# Patient Record
Sex: FEMALE | Race: WHITE | NOT HISPANIC OR LATINO | Employment: FULL TIME | URBAN - METROPOLITAN AREA
[De-identification: names, ages, dates, MRNs, and addresses within clinical notes are randomized per-mention and may not be internally consistent; named-entity substitution may affect disease eponyms.]

---

## 2023-03-14 LAB
ALANINE AMINOTRANSFERASE (SGPT) (U/L) IN SER/PLAS: 17 U/L (ref 7–45)
ALBUMIN (G/DL) IN SER/PLAS: 4.2 G/DL (ref 3.4–5)
ALKALINE PHOSPHATASE (U/L) IN SER/PLAS: 64 U/L (ref 33–136)
ANION GAP IN SER/PLAS: 12 MMOL/L (ref 10–20)
ASPARTATE AMINOTRANSFERASE (SGOT) (U/L) IN SER/PLAS: 17 U/L (ref 9–39)
BASOPHILS (10*3/UL) IN BLOOD BY AUTOMATED COUNT: 0.07 X10E9/L (ref 0–0.1)
BASOPHILS/100 LEUKOCYTES IN BLOOD BY AUTOMATED COUNT: 1.2 % (ref 0–2)
BILIRUBIN TOTAL (MG/DL) IN SER/PLAS: 0.4 MG/DL (ref 0–1.2)
C REACTIVE PROTEIN (MG/L) IN SER/PLAS: 0.7 MG/DL
CALCIUM (MG/DL) IN SER/PLAS: 9.8 MG/DL (ref 8.6–10.3)
CARBON DIOXIDE, TOTAL (MMOL/L) IN SER/PLAS: 25 MMOL/L (ref 21–32)
CHLORIDE (MMOL/L) IN SER/PLAS: 106 MMOL/L (ref 98–107)
CREATININE (MG/DL) IN SER/PLAS: 0.72 MG/DL (ref 0.5–1.05)
CREATININE (MG/DL) IN URINE: 58.8 MG/DL (ref 20–320)
EOSINOPHILS (10*3/UL) IN BLOOD BY AUTOMATED COUNT: 0.15 X10E9/L (ref 0–0.7)
EOSINOPHILS/100 LEUKOCYTES IN BLOOD BY AUTOMATED COUNT: 2.6 % (ref 0–6)
ERYTHROCYTE DISTRIBUTION WIDTH (RATIO) BY AUTOMATED COUNT: 14.5 % (ref 11.5–14.5)
ERYTHROCYTE MEAN CORPUSCULAR HEMOGLOBIN CONCENTRATION (G/DL) BY AUTOMATED: 31.4 G/DL (ref 32–36)
ERYTHROCYTE MEAN CORPUSCULAR VOLUME (FL) BY AUTOMATED COUNT: 95 FL (ref 80–100)
ERYTHROCYTES (10*6/UL) IN BLOOD BY AUTOMATED COUNT: 4.15 X10E12/L (ref 4–5.2)
GFR FEMALE: >90 ML/MIN/1.73M2
GLUCOSE (MG/DL) IN SER/PLAS: 80 MG/DL (ref 74–99)
HEMATOCRIT (%) IN BLOOD BY AUTOMATED COUNT: 39.5 % (ref 36–46)
HEMOGLOBIN (G/DL) IN BLOOD: 12.4 G/DL (ref 12–16)
IMMATURE GRANULOCYTES/100 LEUKOCYTES IN BLOOD BY AUTOMATED COUNT: 0.2 % (ref 0–0.9)
LEUKOCYTES (10*3/UL) IN BLOOD BY AUTOMATED COUNT: 5.7 X10E9/L (ref 4.4–11.3)
LYMPHOCYTES (10*3/UL) IN BLOOD BY AUTOMATED COUNT: 1.44 X10E9/L (ref 1.2–4.8)
LYMPHOCYTES/100 LEUKOCYTES IN BLOOD BY AUTOMATED COUNT: 25.3 % (ref 13–44)
MONOCYTES (10*3/UL) IN BLOOD BY AUTOMATED COUNT: 0.47 X10E9/L (ref 0.1–1)
MONOCYTES/100 LEUKOCYTES IN BLOOD BY AUTOMATED COUNT: 8.3 % (ref 2–10)
NEUTROPHILS (10*3/UL) IN BLOOD BY AUTOMATED COUNT: 3.55 X10E9/L (ref 1.2–7.7)
NEUTROPHILS/100 LEUKOCYTES IN BLOOD BY AUTOMATED COUNT: 62.4 % (ref 40–80)
PLATELETS (10*3/UL) IN BLOOD AUTOMATED COUNT: 293 X10E9/L (ref 150–450)
POTASSIUM (MMOL/L) IN SER/PLAS: 4.2 MMOL/L (ref 3.5–5.3)
PROTEIN (MG/DL) IN URINE: 6 MG/DL (ref 5–24)
PROTEIN TOTAL: 7.4 G/DL (ref 6.4–8.2)
PROTEIN/CREATININE (MG/MG) IN URINE: 0.1 MG/MG CREAT (ref 0–0.17)
SEDIMENTATION RATE, ERYTHROCYTE: 29 MM/H (ref 0–30)
SODIUM (MMOL/L) IN SER/PLAS: 139 MMOL/L (ref 136–145)
UREA NITROGEN (MG/DL) IN SER/PLAS: 17 MG/DL (ref 6–23)

## 2023-03-15 LAB
ANTI-DNA (DS): 2 IU/ML
COMPLEMENT C3 (MG/DL) IN SER/PLAS: 150 MG/DL (ref 87–200)
COMPLEMENT C4 (MG/DL) IN SER/PLAS: 39 MG/DL (ref 10–50)

## 2023-03-28 DIAGNOSIS — I10 ESSENTIAL HYPERTENSION: ICD-10-CM

## 2023-03-28 RX ORDER — AMLODIPINE BESYLATE 5 MG/1
5 TABLET ORAL DAILY
Qty: 90 TABLET | Refills: 1 | Status: SHIPPED | OUTPATIENT
Start: 2023-03-28 | End: 2023-09-14 | Stop reason: SDUPTHER

## 2023-03-28 RX ORDER — AMLODIPINE BESYLATE 5 MG/1
1 TABLET ORAL DAILY
COMMUNITY
Start: 2018-12-17 | End: 2023-03-28 | Stop reason: SDUPTHER

## 2023-05-05 PROBLEM — N89.8 VAGINAL DRYNESS: Status: ACTIVE | Noted: 2023-05-05

## 2023-05-05 PROBLEM — M32.9 SYSTEMIC LUPUS ERYTHEMATOSUS (MULTI): Status: ACTIVE | Noted: 2023-05-05

## 2023-05-05 PROBLEM — K21.9 GERD (GASTROESOPHAGEAL REFLUX DISEASE): Status: ACTIVE | Noted: 2023-05-05

## 2023-05-05 PROBLEM — F32.A DEPRESSIVE DISORDER: Status: ACTIVE | Noted: 2023-05-05

## 2023-05-05 PROBLEM — R80.9 PROTEINURIA: Status: ACTIVE | Noted: 2023-05-05

## 2023-05-05 PROBLEM — G47.00 INSOMNIA: Status: ACTIVE | Noted: 2023-05-05

## 2023-05-05 PROBLEM — M35.00 SJOGRENS SYNDROME (MULTI): Status: ACTIVE | Noted: 2023-05-05

## 2023-05-05 PROBLEM — R94.31 ABNORMAL EKG: Status: ACTIVE | Noted: 2023-05-05

## 2023-05-05 PROBLEM — E66.811 OBESITY (BMI 30.0-34.9): Status: ACTIVE | Noted: 2023-05-05

## 2023-05-05 PROBLEM — N60.19 FIBROCYSTIC BREAST DISEASE: Status: ACTIVE | Noted: 2023-05-05

## 2023-05-05 PROBLEM — E66.9 OBESITY (BMI 30.0-34.9): Status: ACTIVE | Noted: 2023-05-05

## 2023-05-05 PROBLEM — M79.18 MYOFASCIAL PAIN SYNDROME: Status: ACTIVE | Noted: 2023-05-05

## 2023-05-05 PROBLEM — J30.9 ALLERGIC RHINITIS: Status: ACTIVE | Noted: 2023-05-05

## 2023-05-05 PROBLEM — M85.80 OSTEOPENIA: Status: ACTIVE | Noted: 2023-05-05

## 2023-05-05 PROBLEM — N95.1 POSTMENOPAUSAL DISORDER: Status: ACTIVE | Noted: 2023-05-05

## 2023-05-05 RX ORDER — ZOLPIDEM TARTRATE 10 MG/1
1 TABLET ORAL DAILY
COMMUNITY
Start: 2021-09-22 | End: 2023-07-31 | Stop reason: SDUPTHER

## 2023-05-05 RX ORDER — ESOMEPRAZOLE MAGNESIUM 40 MG/1
40 CAPSULE, DELAYED RELEASE ORAL
COMMUNITY

## 2023-05-05 RX ORDER — GABAPENTIN 300 MG/1
300 CAPSULE ORAL 2 TIMES DAILY
COMMUNITY
Start: 2023-03-14

## 2023-05-05 RX ORDER — PNV NO.95/FERROUS FUM/FOLIC AC 28MG-0.8MG
TABLET ORAL
COMMUNITY
Start: 2019-04-15

## 2023-05-05 RX ORDER — LOSARTAN POTASSIUM 100 MG/1
1 TABLET ORAL DAILY
COMMUNITY
Start: 2016-09-14 | End: 2023-08-21 | Stop reason: SDUPTHER

## 2023-05-05 RX ORDER — RUXOLITINIB 15 MG/G
CREAM TOPICAL
COMMUNITY
Start: 2023-01-12

## 2023-05-05 RX ORDER — PYRIDOXINE HCL (VITAMIN B6) 100 MG
TABLET ORAL
COMMUNITY
Start: 2022-03-16

## 2023-05-05 RX ORDER — CEVIMELINE HYDROCHLORIDE 30 MG/1
1 CAPSULE ORAL 2 TIMES DAILY
COMMUNITY
Start: 2018-06-20 | End: 2024-02-19 | Stop reason: SDUPTHER

## 2023-05-05 RX ORDER — BUPROPION HYDROCHLORIDE 300 MG/1
1 TABLET ORAL DAILY
COMMUNITY
Start: 2022-03-16 | End: 2023-05-08 | Stop reason: ALTCHOICE

## 2023-05-05 RX ORDER — MIRTAZAPINE 30 MG/1
1 TABLET, FILM COATED ORAL NIGHTLY
COMMUNITY
Start: 2022-08-26 | End: 2023-05-08 | Stop reason: ALTCHOICE

## 2023-05-05 RX ORDER — HYDROXYCHLOROQUINE SULFATE 200 MG/1
TABLET, FILM COATED ORAL
COMMUNITY
Start: 2012-11-28 | End: 2024-02-19 | Stop reason: SDUPTHER

## 2023-05-05 RX ORDER — PAROXETINE 10 MG/1
1 TABLET, FILM COATED ORAL DAILY
COMMUNITY
Start: 2022-05-27 | End: 2023-05-08 | Stop reason: ALTCHOICE

## 2023-05-05 RX ORDER — KETOCONAZOLE 20 MG/ML
SHAMPOO, SUSPENSION TOPICAL
COMMUNITY
Start: 2022-01-12

## 2023-05-05 RX ORDER — CYCLOSPORINE 0.5 MG/ML
1 EMULSION OPHTHALMIC 2 TIMES DAILY
COMMUNITY

## 2023-05-05 RX ORDER — ACETAMINOPHEN AND PHENYLEPHRINE HCL 325; 5 MG/1; MG/1
TABLET ORAL
COMMUNITY
Start: 2022-03-16

## 2023-05-05 RX ORDER — FLUTICASONE PROPIONATE 50 MCG
SPRAY, SUSPENSION (ML) NASAL
COMMUNITY
Start: 2015-07-20

## 2023-05-05 RX ORDER — BUPROPION HYDROCHLORIDE 150 MG/1
1 TABLET ORAL DAILY
COMMUNITY
Start: 2022-06-14 | End: 2023-05-08 | Stop reason: ALTCHOICE

## 2023-05-08 ENCOUNTER — OFFICE VISIT (OUTPATIENT)
Dept: PRIMARY CARE | Facility: CLINIC | Age: 63
End: 2023-05-08
Payer: COMMERCIAL

## 2023-05-08 VITALS
BODY MASS INDEX: 32.28 KG/M2 | SYSTOLIC BLOOD PRESSURE: 138 MMHG | RESPIRATION RATE: 16 BRPM | DIASTOLIC BLOOD PRESSURE: 90 MMHG | WEIGHT: 182.2 LBS | OXYGEN SATURATION: 99 % | HEART RATE: 71 BPM | TEMPERATURE: 97.5 F

## 2023-05-08 DIAGNOSIS — I10 ESSENTIAL HYPERTENSION: ICD-10-CM

## 2023-05-08 DIAGNOSIS — Z00.00 ANNUAL PHYSICAL EXAM: ICD-10-CM

## 2023-05-08 DIAGNOSIS — M35.00 SJOGREN'S SYNDROME, WITH UNSPECIFIED ORGAN INVOLVEMENT (MULTI): ICD-10-CM

## 2023-05-08 DIAGNOSIS — K21.9 GASTROESOPHAGEAL REFLUX DISEASE WITHOUT ESOPHAGITIS: ICD-10-CM

## 2023-05-08 DIAGNOSIS — F51.01 PRIMARY INSOMNIA: Primary | ICD-10-CM

## 2023-05-08 DIAGNOSIS — Z12.31 ENCOUNTER FOR SCREENING MAMMOGRAM FOR MALIGNANT NEOPLASM OF BREAST: ICD-10-CM

## 2023-05-08 DIAGNOSIS — M79.18 MYOFASCIAL PAIN SYNDROME: ICD-10-CM

## 2023-05-08 DIAGNOSIS — M32.8 OTHER FORMS OF SYSTEMIC LUPUS ERYTHEMATOSUS, UNSPECIFIED ORGAN INVOLVEMENT STATUS (MULTI): ICD-10-CM

## 2023-05-08 PROBLEM — F32.A DEPRESSIVE DISORDER: Status: RESOLVED | Noted: 2023-05-05 | Resolved: 2023-05-08

## 2023-05-08 PROCEDURE — 1036F TOBACCO NON-USER: CPT | Performed by: INTERNAL MEDICINE

## 2023-05-08 PROCEDURE — 3080F DIAST BP >= 90 MM HG: CPT | Performed by: INTERNAL MEDICINE

## 2023-05-08 PROCEDURE — 99214 OFFICE O/P EST MOD 30 MIN: CPT | Performed by: INTERNAL MEDICINE

## 2023-05-08 PROCEDURE — 3075F SYST BP GE 130 - 139MM HG: CPT | Performed by: INTERNAL MEDICINE

## 2023-05-08 RX ORDER — UBIDECARENONE 30 MG
30 CAPSULE ORAL DAILY
COMMUNITY

## 2023-05-08 RX ORDER — CARBOXYMETHYLCELLULOSE SODIUM, GLYCERIN AND POLYSORBATE 80 5; 10; 5 MG/ML; MG/ML; MG/ML
SOLUTION/ DROPS OPHTHALMIC
COMMUNITY

## 2023-05-08 NOTE — PROGRESS NOTES
The patient is here today for a follow up appointment.Subjective   Patient ID: Mojgan Villalobos is a 62 y.o. female who presents for Follow-up.  63 yo female here for followup  Gabapentin is helping her achiness - it is keeping her asleep  She still need the zolpidem        Review of Systems    Objective   Physical Exam  Vitals and nursing note reviewed.   Cardiovascular:      Rate and Rhythm: Normal rate and regular rhythm.   Pulmonary:      Effort: Pulmonary effort is normal.      Breath sounds: Normal breath sounds.         Assessment/Plan   Problem List Items Addressed This Visit       Essential hypertension    Current Assessment & Plan     A little high today - monitor         GERD (gastroesophageal reflux disease)    Insomnia - Primary    Current Assessment & Plan     Rec add in Melatonin and see if she can decrease Zolpidem dose         Myofascial pain syndrome    Current Assessment & Plan     Rec take Aleve 2 po bid on days she is active         Sjogrens syndrome (CMS/HCC)    Systemic lupus erythematosus (CMS/HCC)    Follow up with me in October for Welcome to Medicare

## 2023-07-18 LAB
ANION GAP IN SER/PLAS: 12 MMOL/L (ref 10–20)
BASOPHILS (10*3/UL) IN BLOOD BY AUTOMATED COUNT: 0.06 X10E9/L (ref 0–0.1)
BASOPHILS/100 LEUKOCYTES IN BLOOD BY AUTOMATED COUNT: 1.1 % (ref 0–2)
CALCIUM (MG/DL) IN SER/PLAS: 9.8 MG/DL (ref 8.6–10.3)
CARBON DIOXIDE, TOTAL (MMOL/L) IN SER/PLAS: 28 MMOL/L (ref 21–32)
CHLORIDE (MMOL/L) IN SER/PLAS: 105 MMOL/L (ref 98–107)
CREATININE (MG/DL) IN SER/PLAS: 0.84 MG/DL (ref 0.5–1.05)
CREATININE (MG/DL) IN URINE: 69.9 MG/DL (ref 20–320)
EOSINOPHILS (10*3/UL) IN BLOOD BY AUTOMATED COUNT: 0.27 X10E9/L (ref 0–0.7)
EOSINOPHILS/100 LEUKOCYTES IN BLOOD BY AUTOMATED COUNT: 4.8 % (ref 0–6)
ERYTHROCYTE DISTRIBUTION WIDTH (RATIO) BY AUTOMATED COUNT: 14.5 % (ref 11.5–14.5)
ERYTHROCYTE MEAN CORPUSCULAR HEMOGLOBIN CONCENTRATION (G/DL) BY AUTOMATED: 33.3 G/DL (ref 32–36)
ERYTHROCYTE MEAN CORPUSCULAR VOLUME (FL) BY AUTOMATED COUNT: 90 FL (ref 80–100)
ERYTHROCYTES (10*6/UL) IN BLOOD BY AUTOMATED COUNT: 4.05 X10E12/L (ref 4–5.2)
GFR FEMALE: 77 ML/MIN/1.73M2
GLUCOSE (MG/DL) IN SER/PLAS: 101 MG/DL (ref 74–99)
HEMATOCRIT (%) IN BLOOD BY AUTOMATED COUNT: 36.6 % (ref 36–46)
HEMOGLOBIN (G/DL) IN BLOOD: 12.2 G/DL (ref 12–16)
IMMATURE GRANULOCYTES/100 LEUKOCYTES IN BLOOD BY AUTOMATED COUNT: 0.2 % (ref 0–0.9)
LEUKOCYTES (10*3/UL) IN BLOOD BY AUTOMATED COUNT: 5.7 X10E9/L (ref 4.4–11.3)
LYMPHOCYTES (10*3/UL) IN BLOOD BY AUTOMATED COUNT: 1.35 X10E9/L (ref 1.2–4.8)
LYMPHOCYTES/100 LEUKOCYTES IN BLOOD BY AUTOMATED COUNT: 23.9 % (ref 13–44)
MONOCYTES (10*3/UL) IN BLOOD BY AUTOMATED COUNT: 0.61 X10E9/L (ref 0.1–1)
MONOCYTES/100 LEUKOCYTES IN BLOOD BY AUTOMATED COUNT: 10.8 % (ref 2–10)
NEUTROPHILS (10*3/UL) IN BLOOD BY AUTOMATED COUNT: 3.35 X10E9/L (ref 1.2–7.7)
NEUTROPHILS/100 LEUKOCYTES IN BLOOD BY AUTOMATED COUNT: 59.2 % (ref 40–80)
PLATELETS (10*3/UL) IN BLOOD AUTOMATED COUNT: 326 X10E9/L (ref 150–450)
POTASSIUM (MMOL/L) IN SER/PLAS: 4.9 MMOL/L (ref 3.5–5.3)
PROTEIN (MG/DL) IN URINE: 7 MG/DL (ref 5–24)
PROTEIN/CREATININE (MG/MG) IN URINE: 0.1 MG/MG CREAT (ref 0–0.17)
SEDIMENTATION RATE, ERYTHROCYTE: 9 MM/H (ref 0–30)
SODIUM (MMOL/L) IN SER/PLAS: 140 MMOL/L (ref 136–145)
UREA NITROGEN (MG/DL) IN SER/PLAS: 19 MG/DL (ref 6–23)

## 2023-07-31 DIAGNOSIS — F51.01 PRIMARY INSOMNIA: Primary | ICD-10-CM

## 2023-07-31 RX ORDER — ZOLPIDEM TARTRATE 10 MG/1
10 TABLET ORAL DAILY
Qty: 90 TABLET | Refills: 0 | Status: SHIPPED | OUTPATIENT
Start: 2023-07-31 | End: 2023-10-12 | Stop reason: SDUPTHER

## 2023-08-21 DIAGNOSIS — I10 ESSENTIAL HYPERTENSION: ICD-10-CM

## 2023-08-21 RX ORDER — LOSARTAN POTASSIUM 100 MG/1
100 TABLET ORAL DAILY
Qty: 90 TABLET | Refills: 1 | Status: SHIPPED | OUTPATIENT
Start: 2023-08-21 | End: 2024-02-19 | Stop reason: SDUPTHER

## 2023-09-14 DIAGNOSIS — I10 ESSENTIAL HYPERTENSION: ICD-10-CM

## 2023-09-14 RX ORDER — AMLODIPINE BESYLATE 5 MG/1
5 TABLET ORAL DAILY
Qty: 90 TABLET | Refills: 1 | Status: SHIPPED | OUTPATIENT
Start: 2023-09-14 | End: 2023-09-25

## 2023-09-23 DIAGNOSIS — I10 ESSENTIAL HYPERTENSION: ICD-10-CM

## 2023-09-23 PROBLEM — E66.811 CLASS 1 OBESITY WITH BODY MASS INDEX (BMI) OF 31.0 TO 31.9 IN ADULT: Status: ACTIVE | Noted: 2023-09-23

## 2023-09-23 PROBLEM — G57.00 PIRIFORMIS SYNDROME: Status: ACTIVE | Noted: 2023-09-23

## 2023-09-23 PROBLEM — M79.7 FIBROMYALGIA: Status: ACTIVE | Noted: 2023-09-23

## 2023-09-23 PROBLEM — E66.9 CLASS 1 OBESITY WITH BODY MASS INDEX (BMI) OF 31.0 TO 31.9 IN ADULT: Status: ACTIVE | Noted: 2023-09-23

## 2023-09-23 PROBLEM — M76.30 ITB SYNDROME: Status: ACTIVE | Noted: 2023-09-23

## 2023-09-23 RX ORDER — PAROXETINE 10 MG/1
10 TABLET, FILM COATED ORAL
COMMUNITY

## 2023-09-25 RX ORDER — AMLODIPINE BESYLATE 5 MG/1
5 TABLET ORAL DAILY
Qty: 90 TABLET | Refills: 1 | Status: SHIPPED | OUTPATIENT
Start: 2023-09-25 | End: 2024-02-19 | Stop reason: SDUPTHER

## 2023-09-27 ENCOUNTER — LAB (OUTPATIENT)
Dept: LAB | Facility: LAB | Age: 63
End: 2023-09-27
Payer: MEDICARE

## 2023-09-27 DIAGNOSIS — I10 ESSENTIAL HYPERTENSION: ICD-10-CM

## 2023-09-27 DIAGNOSIS — Z00.00 ANNUAL PHYSICAL EXAM: ICD-10-CM

## 2023-09-27 LAB
CHOLESTEROL (MG/DL) IN SER/PLAS: 179 MG/DL (ref 0–199)
CHOLESTEROL IN HDL (MG/DL) IN SER/PLAS: 63.6 MG/DL
CHOLESTEROL/HDL RATIO: 2.8
HEPATITIS C VIRUS AB PRESENCE IN SERUM: NONREACTIVE
LDL: 102 MG/DL (ref 0–99)
THYROTROPIN (MIU/L) IN SER/PLAS BY DETECTION LIMIT <= 0.05 MIU/L: 2.58 MIU/L (ref 0.44–3.98)
TRIGLYCERIDE (MG/DL) IN SER/PLAS: 67 MG/DL (ref 0–149)
VLDL: 13 MG/DL (ref 0–40)

## 2023-09-27 PROCEDURE — 84443 ASSAY THYROID STIM HORMONE: CPT

## 2023-09-27 PROCEDURE — 36415 COLL VENOUS BLD VENIPUNCTURE: CPT

## 2023-09-27 PROCEDURE — 86803 HEPATITIS C AB TEST: CPT

## 2023-09-27 PROCEDURE — 80061 LIPID PANEL: CPT

## 2023-10-10 ENCOUNTER — APPOINTMENT (OUTPATIENT)
Dept: RADIOLOGY | Facility: CLINIC | Age: 63
End: 2023-10-10
Payer: COMMERCIAL

## 2023-10-11 ENCOUNTER — ANCILLARY PROCEDURE (OUTPATIENT)
Dept: RADIOLOGY | Facility: CLINIC | Age: 63
End: 2023-10-11
Payer: MEDICARE

## 2023-10-11 DIAGNOSIS — Z12.31 ENCOUNTER FOR SCREENING MAMMOGRAM FOR MALIGNANT NEOPLASM OF BREAST: ICD-10-CM

## 2023-10-11 PROCEDURE — 77067 SCR MAMMO BI INCL CAD: CPT | Mod: BILATERAL PROCEDURE | Performed by: RADIOLOGY

## 2023-10-11 PROCEDURE — 77063 BREAST TOMOSYNTHESIS BI: CPT

## 2023-10-11 PROCEDURE — 77063 BREAST TOMOSYNTHESIS BI: CPT | Mod: BILATERAL PROCEDURE | Performed by: RADIOLOGY

## 2023-10-12 ENCOUNTER — LAB (OUTPATIENT)
Dept: LAB | Facility: LAB | Age: 63
End: 2023-10-12
Payer: MEDICARE

## 2023-10-12 ENCOUNTER — OFFICE VISIT (OUTPATIENT)
Dept: PRIMARY CARE | Facility: CLINIC | Age: 63
End: 2023-10-12
Payer: MEDICARE

## 2023-10-12 VITALS
HEIGHT: 63 IN | RESPIRATION RATE: 16 BRPM | DIASTOLIC BLOOD PRESSURE: 86 MMHG | OXYGEN SATURATION: 97 % | SYSTOLIC BLOOD PRESSURE: 152 MMHG | TEMPERATURE: 97 F | BODY MASS INDEX: 32.04 KG/M2 | HEART RATE: 71 BPM | WEIGHT: 180.8 LBS

## 2023-10-12 DIAGNOSIS — Z13.6 SCREENING FOR CARDIOVASCULAR CONDITION: ICD-10-CM

## 2023-10-12 DIAGNOSIS — Z23 ENCOUNTER FOR IMMUNIZATION: ICD-10-CM

## 2023-10-12 DIAGNOSIS — M32.8 OTHER FORMS OF SYSTEMIC LUPUS ERYTHEMATOSUS, UNSPECIFIED ORGAN INVOLVEMENT STATUS (MULTI): ICD-10-CM

## 2023-10-12 DIAGNOSIS — I10 ESSENTIAL HYPERTENSION: ICD-10-CM

## 2023-10-12 DIAGNOSIS — M79.10 MYALGIA: ICD-10-CM

## 2023-10-12 DIAGNOSIS — F51.01 PRIMARY INSOMNIA: ICD-10-CM

## 2023-10-12 DIAGNOSIS — K21.9 GASTROESOPHAGEAL REFLUX DISEASE WITHOUT ESOPHAGITIS: ICD-10-CM

## 2023-10-12 DIAGNOSIS — M79.10 MYALGIA: Chronic | ICD-10-CM

## 2023-10-12 DIAGNOSIS — Z00.00 ROUTINE GENERAL MEDICAL EXAMINATION AT HEALTH CARE FACILITY: Primary | ICD-10-CM

## 2023-10-12 LAB
CRP SERPL-MCNC: 1.54 MG/DL
ERYTHROCYTE [SEDIMENTATION RATE] IN BLOOD BY WESTERGREN METHOD: 12 MM/H (ref 0–30)

## 2023-10-12 PROCEDURE — 3077F SYST BP >= 140 MM HG: CPT | Performed by: INTERNAL MEDICINE

## 2023-10-12 PROCEDURE — G0009 ADMIN PNEUMOCOCCAL VACCINE: HCPCS | Performed by: INTERNAL MEDICINE

## 2023-10-12 PROCEDURE — G0008 ADMIN INFLUENZA VIRUS VAC: HCPCS | Performed by: INTERNAL MEDICINE

## 2023-10-12 PROCEDURE — 90677 PCV20 VACCINE IM: CPT | Performed by: INTERNAL MEDICINE

## 2023-10-12 PROCEDURE — 90662 IIV NO PRSV INCREASED AG IM: CPT | Performed by: INTERNAL MEDICINE

## 2023-10-12 PROCEDURE — G0403 EKG FOR INITIAL PREVENT EXAM: HCPCS | Performed by: INTERNAL MEDICINE

## 2023-10-12 PROCEDURE — G0402 INITIAL PREVENTIVE EXAM: HCPCS | Performed by: INTERNAL MEDICINE

## 2023-10-12 PROCEDURE — 1036F TOBACCO NON-USER: CPT | Performed by: INTERNAL MEDICINE

## 2023-10-12 PROCEDURE — 1160F RVW MEDS BY RX/DR IN RCRD: CPT | Performed by: INTERNAL MEDICINE

## 2023-10-12 PROCEDURE — 36415 COLL VENOUS BLD VENIPUNCTURE: CPT

## 2023-10-12 PROCEDURE — 85652 RBC SED RATE AUTOMATED: CPT

## 2023-10-12 PROCEDURE — 86140 C-REACTIVE PROTEIN: CPT

## 2023-10-12 PROCEDURE — 1159F MED LIST DOCD IN RCRD: CPT | Performed by: INTERNAL MEDICINE

## 2023-10-12 PROCEDURE — 3079F DIAST BP 80-89 MM HG: CPT | Performed by: INTERNAL MEDICINE

## 2023-10-12 PROCEDURE — 1170F FXNL STATUS ASSESSED: CPT | Performed by: INTERNAL MEDICINE

## 2023-10-12 RX ORDER — ZOLPIDEM TARTRATE 10 MG/1
10 TABLET ORAL DAILY
Qty: 90 TABLET | Refills: 1 | Status: SHIPPED | OUTPATIENT
Start: 2023-10-12 | End: 2024-01-23 | Stop reason: SDUPTHER

## 2023-10-12 ASSESSMENT — VISUAL ACUITY
OD_CC: 20/30
OS_CC: 20/25

## 2023-10-12 ASSESSMENT — ACTIVITIES OF DAILY LIVING (ADL)
MANAGING_FINANCES: INDEPENDENT
BATHING: INDEPENDENT
DOING_HOUSEWORK: INDEPENDENT
GROCERY_SHOPPING: INDEPENDENT
DRESSING: INDEPENDENT
TAKING_MEDICATION: INDEPENDENT

## 2023-10-12 ASSESSMENT — PATIENT HEALTH QUESTIONNAIRE - PHQ9
2. FEELING DOWN, DEPRESSED OR HOPELESS: NOT AT ALL
SUM OF ALL RESPONSES TO PHQ9 QUESTIONS 1 AND 2: 0
1. LITTLE INTEREST OR PLEASURE IN DOING THINGS: NOT AT ALL

## 2023-10-12 NOTE — PROGRESS NOTES
"Subjective   Reason for Visit: Mojgan Villalobos is an 63 y.o. female here for a Medicare Wellness visit.     Past Medical, Surgical, and Family History reviewed and updated in chart.         62 yo female her for Welcome to Medicare Visit    C/o 2 months of worsening upper extremity pain- bilateral   Hurts to lift arms up  Cannot combing her hair wihtout pain  Trouble getting up from a chair  No paresthesias  Does all her housework and yardwork and grooms dogs - this is all exhaustin  Pain wakes her up at night  Both ankles are swollen too        Patient Care Team:  Isabella Barrios MD as PCP - General  Isabella Barrios MD as PCP - [facility] PCP  Jerman Renner MD as Referring Physician ([facility])  Sheri Waldron MD as Referring Physician (Ophthalmology)  Nolan Pugh DC as Referring Physician (Chiropractic Medicine)     Review of Systems   Constitutional:  Negative for activity change, appetite change, fatigue and unexpected weight change.   HENT:  Negative for ear pain, hearing loss, tinnitus, trouble swallowing and voice change.    Eyes:  Negative for visual disturbance.   Respiratory:  Negative for cough, chest tightness and wheezing.    Cardiovascular:  Negative for chest pain, palpitations and leg swelling.   Gastrointestinal:  Negative for blood in stool, constipation, diarrhea, nausea and vomiting.   Genitourinary:  Negative for difficulty urinating, hematuria and vaginal bleeding.   Musculoskeletal:  Positive for arthralgias, myalgias and neck pain.   Skin:  Negative for rash.   Neurological:  Negative for dizziness, tremors, syncope, light-headedness and headaches.       Objective   Vitals:  /86   Pulse 71   Temp 36.1 °C (97 °F)   Resp 16   Ht 1.6 m (5' 3\")   Wt 82 kg (180 lb 12.8 oz)   SpO2 97%   BMI 32.03 kg/m²       Physical Exam  Vitals and nursing note reviewed.   Constitutional:       General: She is not in acute distress.     Appearance: She is " well-developed. She is not diaphoretic.   HENT:      Head: Normocephalic.      Right Ear: Tympanic membrane normal. There is no impacted cerumen.      Left Ear: Tympanic membrane normal. There is no impacted cerumen.      Nose: Nose normal.      Mouth/Throat:      Mouth: Mucous membranes are moist.      Pharynx: Oropharynx is clear. No oropharyngeal exudate or posterior oropharyngeal erythema.   Eyes:      General: No scleral icterus.     Extraocular Movements: Extraocular movements intact.      Conjunctiva/sclera: Conjunctivae normal.      Pupils: Pupils are equal, round, and reactive to light.   Neck:      Thyroid: No thyromegaly.      Vascular: No JVD.   Cardiovascular:      Rate and Rhythm: Normal rate and regular rhythm.      Pulses: Normal pulses.      Heart sounds: Normal heart sounds. No murmur heard.     No friction rub. No gallop.   Pulmonary:      Effort: Pulmonary effort is normal. No respiratory distress.      Breath sounds: Normal breath sounds. No wheezing or rales.   Chest:      Chest wall: No tenderness.   Abdominal:      General: Bowel sounds are normal. There is no distension.      Palpations: Abdomen is soft. There is no mass.      Tenderness: There is no abdominal tenderness. There is no rebound.   Musculoskeletal:         General: Normal range of motion.      Right shoulder: Normal.      Left shoulder: Normal.      Right upper arm: Normal.      Left upper arm: Normal.      Cervical back: Normal range of motion and neck supple.      Comments: Pain with ROM bilateral UE   Lymphadenopathy:      Cervical: No cervical adenopathy.   Skin:     General: Skin is warm and dry.   Neurological:      General: No focal deficit present.      Mental Status: She is alert and oriented to person, place, and time.      Deep Tendon Reflexes: Reflexes normal.   Psychiatric:         Mood and Affect: Mood normal.         Thought Content: Thought content normal.         Assessment/Plan   Problem List Items Addressed  This Visit       Essential hypertension    Current Assessment & Plan     Elevated today.  She is seeing Dr. Rosenthal in 2 weeks and we will see where she is at at that point.  If her blood pressure is still elevated she will need to be started on a diuretic.  She is already having some mild lower extremity edema related to the amlodipine and I would not recommend increasing the dose on that.         GERD (gastroesophageal reflux disease)    Insomnia    Relevant Medications    zolpidem (Ambien) 10 mg tablet    Systemic lupus erythematosus (CMS/HCC)    Current Assessment & Plan     Continue follow-up with Dr. Rosenthal          Other Visit Diagnoses       Routine general medical examination at health care facility    -  Primary    Encounter for immunization        Relevant Orders    Flu vaccine, quadrivalent, high-dose, preservative free, age 65y+ (FLUZONE) (Completed)    Myalgia  (Chronic)       Exam concerning for PMR.  Check ESR and CRP.  If elevated she will need to be started on prednisone 10 mg twice daily with fu with Dr Rosenthal as scheduled    Relevant Orders    Sedimentation Rate (Completed)    C-reactive protein (Completed)    Screening for cardiovascular condition        Relevant Orders    ECG 12 lead

## 2023-10-13 DIAGNOSIS — M35.3 POLYMYALGIA RHEUMATICA (MULTI): Primary | ICD-10-CM

## 2023-10-13 RX ORDER — PREDNISONE 10 MG/1
10 TABLET ORAL 2 TIMES DAILY
Qty: 60 TABLET | Refills: 0 | Status: SHIPPED | OUTPATIENT
Start: 2023-10-13 | End: 2023-10-23 | Stop reason: SDUPTHER

## 2023-10-13 ASSESSMENT — ENCOUNTER SYMPTOMS
LIGHT-HEADEDNESS: 0
CONSTIPATION: 0
NAUSEA: 0
PALPITATIONS: 0
DIFFICULTY URINATING: 0
DIZZINESS: 0
VOICE CHANGE: 0
NECK PAIN: 1
MYALGIAS: 1
TREMORS: 0
VOMITING: 0
FATIGUE: 0
DIARRHEA: 0
TROUBLE SWALLOWING: 0
UNEXPECTED WEIGHT CHANGE: 0
CHEST TIGHTNESS: 0
ARTHRALGIAS: 1
ACTIVITY CHANGE: 0
APPETITE CHANGE: 0
WHEEZING: 0
BLOOD IN STOOL: 0
HEADACHES: 0
COUGH: 0
HEMATURIA: 0

## 2023-10-13 NOTE — ASSESSMENT & PLAN NOTE
Elevated today.  She is seeing Dr. Rosenthal in 2 weeks and we will see where she is at at that point.  If her blood pressure is still elevated she will need to be started on a diuretic.  She is already having some mild lower extremity edema related to the amlodipine and I would not recommend increasing the dose on that.

## 2023-10-23 ENCOUNTER — TELEPHONE (OUTPATIENT)
Dept: PRIMARY CARE | Facility: CLINIC | Age: 63
End: 2023-10-23

## 2023-10-23 ENCOUNTER — OFFICE VISIT (OUTPATIENT)
Dept: RHEUMATOLOGY | Facility: CLINIC | Age: 63
End: 2023-10-23
Payer: MEDICARE

## 2023-10-23 VITALS
WEIGHT: 176 LBS | HEART RATE: 60 BPM | BODY MASS INDEX: 31.18 KG/M2 | DIASTOLIC BLOOD PRESSURE: 62 MMHG | TEMPERATURE: 97.1 F | OXYGEN SATURATION: 97 % | SYSTOLIC BLOOD PRESSURE: 110 MMHG | HEIGHT: 63 IN

## 2023-10-23 DIAGNOSIS — Z79.52 CURRENT CHRONIC USE OF SYSTEMIC STEROIDS: ICD-10-CM

## 2023-10-23 DIAGNOSIS — M35.00 SJOGREN'S SYNDROME, WITH UNSPECIFIED ORGAN INVOLVEMENT (MULTI): ICD-10-CM

## 2023-10-23 DIAGNOSIS — M35.3 PMR (POLYMYALGIA RHEUMATICA) (MULTI): ICD-10-CM

## 2023-10-23 DIAGNOSIS — M35.3 POLYMYALGIA RHEUMATICA (MULTI): ICD-10-CM

## 2023-10-23 DIAGNOSIS — M32.9 SYSTEMIC LUPUS ERYTHEMATOSUS, UNSPECIFIED SLE TYPE, UNSPECIFIED ORGAN INVOLVEMENT STATUS (MULTI): Primary | ICD-10-CM

## 2023-10-23 PROCEDURE — 3074F SYST BP LT 130 MM HG: CPT | Performed by: INTERNAL MEDICINE

## 2023-10-23 PROCEDURE — 1159F MED LIST DOCD IN RCRD: CPT | Performed by: INTERNAL MEDICINE

## 2023-10-23 PROCEDURE — 1160F RVW MEDS BY RX/DR IN RCRD: CPT | Performed by: INTERNAL MEDICINE

## 2023-10-23 PROCEDURE — 99214 OFFICE O/P EST MOD 30 MIN: CPT | Performed by: INTERNAL MEDICINE

## 2023-10-23 PROCEDURE — 3078F DIAST BP <80 MM HG: CPT | Performed by: INTERNAL MEDICINE

## 2023-10-23 PROCEDURE — 1036F TOBACCO NON-USER: CPT | Performed by: INTERNAL MEDICINE

## 2023-10-23 RX ORDER — PREDNISONE 10 MG/1
TABLET ORAL
Qty: 60 TABLET | Refills: 3 | Status: SHIPPED | OUTPATIENT
Start: 2023-10-23 | End: 2023-12-06 | Stop reason: WASHOUT

## 2023-10-23 NOTE — PROGRESS NOTES
Subjective   Patient ID: Mojgan Villalobos is a 63 y.o. female who presents for 4 month follow up.    HPI 63-year-old female here for virtual follow-up regarding SLE and Sjogren syndrome (SABINO 1:640, positive SSA and SSB antibodies, negative double-stranded DNA). She remains on hydroxychloroquine 300 mg daily, evoxac 30 mg 2 times daily, Systane and GenTeal drops for dry eyes.     Today she complains of pain and stiffness in her shoulders and hips which started June 2023.  She denied headaches, jaw claudication or blurred vision.  Labs done October 2023 were remarkable for normal ESR but CRP 1.54 (normal is less than 1).  She started prednisone 10 mg twice daily for presumptive diagnosis of PMR 1 week ago.  She felt nearly 100% better with 1 dose.    She is experiencing some side effects from prednisone, including feeling hyper, only being able to sleep 3 to 4 hours, and some sweats.  She also became short tempered with her  yesterday.    Cervical spine x-ray done March 2023 shows degenerative disc disease at C5-6, C6-7, C7-T1.  She is working with a chiropractor which has improved her range of motion.    She weaned off gabapentin within the last week.  She takes zolpidem on a regular basis.    She has started taking cevimeline 3 times daily-her dose around 7 PM. she does not like Biotene toothpaste.  Xylimelt discs did help with dry mouth.  She is working with her ophthalmologist Dr. Waldron regarding dry eyes. She did fail Xiidra. She is back on restasis, which she thinks works well. . She uses Refresh Optiv Jossue-3 drops (no preservative).    Last eye exam was December 2022 with Dr. Renner (includes OCT).  Follow-up as scheduled December 2023.    She gets dental exams every 4 months because of dry mouth.  She needs 2 new crowns due to dental decay.  She also needs a crown replaced on the left upper arch because of caries beneath the crown.  She notes that she is meticulous about her dental hygiene.  Her  "dentist is now also giving her fluoride treatments.    GERD improved with increasing Nexium to 40 mg daily.    She was told on dental exam June 2021 that bones appeared osteopenic.  DEXA done August 2021 shows femoral neck T score -1.0, normal total hip T score, normal lumbar spine T score.    She had Pfizer COVID-19 vaccine March 10, 2021 and March 31, 2021. She had booster 11/21. She had second booster May 2022 . She had Pfizer bivalent booster November 12, 2022.  She had flu vaccine October 31, 2022    Labs November 2022: Cholesterol 209, HDL 72, , triglycerides 69, CMP normal, CBC normal except white blood cell count 3.0, TSH 1.93, spot urine protein to creatinine ratio 0.11  March 2023:CBC Normal, CMP normal, ESR 29, CRP 0.7 (less than 1), double-stranded DNA negative, C3 and C4 normal, spot urine creatinine ratio 0.1  Labs July 2023: CBC normal, BMP normal except glucose 101, ESR 9, spot urine protein to creatinine ratio 0.1  Labs October 2023: ESR 12, CRP 1.54 (normal less than 1)    Cervical spine x-ray 3/23: DJD C5-6, C6-7, C7-T1    DEXA 8/21: T score -1.0 left femoral neck, T score normal total hip, T score normal lumbar spine    EKG 10/22: QTc 470 ms    ROS:  General: Denies fevers or chills.  CV: Denies chest pain or palpitations.  Denies leg edema.  Lungs: Denies coughing or shortness of breath.  Skin: Denies rashes or nodules.  MS: Denies joint pain or joint swelling.     Objective   /62 (BP Location: Left arm, Patient Position: Sitting)   Pulse 60   Temp 36.2 °C (97.1 °F) (Skin)   Ht 1.6 m (5' 3\")   Wt 79.8 kg (176 lb)   SpO2 97%   BMI 31.18 kg/m²     Physical Exam  HEENT: PERRL, EOMI. Temporal arteries nontender.  Neck: Supple, no nodes.  CV: RRR, no MGR.  Lungs: Clear, no rales or wheezes.  Abdomen: Soft, nontender. No hepatosplenomegaly.  Extremities:  No cyanosis, clubbing, or edema.   MS: No synovitis.  Skin: No rashes or nodules.      Assessment/Plan   Problem List Items " Addressed This Visit             ICD-10-CM    Sjogrens syndrome (CMS/[facility]) M35.00    Systemic lupus erythematosus (CMS/[facility]) - Primary M32.9    PMR (polymyalgia rheumatica) (CMS/[facility]) M35.3        1. SLE : Currently stable.     2. Sjogren's syndrome- reasonably stable. Dry eyes have improved with use of Restasis.   Xylimelt helps. Cevimilene 3x daily helps. Dentist is now doing fluoride treatments.   Sees dentist every 4 months.    3. Bone loss in jaw on dental x-rays- DEXA 8/21 shows osteopenia with T score -1.0 in the femoral neck. BMD is stable since 2013.    4. GERD- Having several x per week on nexium 20 mg daily.    5. Fibromyalgia- She stopped gabapentin 1 week ago.    6. BMI 31- stable. She will continue to work on weight loss.    7. Cervical spondylosis-advised on gentle range of motion exercises. She can continue with chiropractic care since she finds it beneficial.    8.  PMR-symptoms began in June 2023.  CRP was elevated at 1.5 4 October 2023.  Symptoms resolved within 24 hours of taking prednisone 10 mg twice daily.  She is experiencing some side effects from prednisone, including excess energy, insomnia and sweats.  I discussed potential side effects of prednisone, including increased risk of infections, cataracts and osteoporosis.  I briefly discussed option of adding Kevzara-discussed risks and benefits.  I do not think we need to add this yet.    Plan:  Change calcium to Citracal 500 mg daily.  Add vitamin D 1000 international units daily.  Reduce prednisone to 10 mg in AM and 5 mg in PM.  Bone density ordered.  Check labs in 1 month: CBC with diff, BMP, dsDNA, C3, C4, ESR, CRP, spot urine protein to creatinine ratio.  Follow up in 4-6 weeks.

## 2023-10-23 NOTE — PATIENT INSTRUCTIONS
Change calcium to Citracal 500 mg daily.  Add vitamin D 1000 international units daily.  Reduce prednisone to 10 mg in AM and 5 mg in PM.  Bone density ordered.  Check labs in 1 month: CBC with diff, BMP, dsDNA, C3, C4, ESR, CRP, spot urine protein to creatinine ratio.  Follow up in 4-6 weeks.

## 2023-10-23 NOTE — TELEPHONE ENCOUNTER
Pt was in for an appt today with you. At check out she was advised to do a 4-6 week follow up. I was able to put her down for 12/6/23 at 2:30pm. This is slightly over at about 7 weeks for her follow up so we are just making sure you are ok with that? Please advise. Thank you!

## 2023-10-26 ENCOUNTER — ANCILLARY PROCEDURE (OUTPATIENT)
Dept: RADIOLOGY | Facility: CLINIC | Age: 63
End: 2023-10-26
Payer: MEDICARE

## 2023-10-26 DIAGNOSIS — Z79.52 CURRENT CHRONIC USE OF SYSTEMIC STEROIDS: ICD-10-CM

## 2023-10-26 PROCEDURE — 77080 DXA BONE DENSITY AXIAL: CPT | Performed by: STUDENT IN AN ORGANIZED HEALTH CARE EDUCATION/TRAINING PROGRAM

## 2023-10-26 PROCEDURE — 77080 DXA BONE DENSITY AXIAL: CPT

## 2023-11-13 DIAGNOSIS — M35.3 PMR (POLYMYALGIA RHEUMATICA) (MULTI): Primary | ICD-10-CM

## 2023-11-13 RX ORDER — PREDNISONE 5 MG/1
TABLET ORAL
Qty: 90 TABLET | Refills: 3 | Status: SHIPPED | OUTPATIENT
Start: 2023-11-13 | End: 2024-03-18

## 2023-11-20 ENCOUNTER — LAB (OUTPATIENT)
Dept: LAB | Facility: LAB | Age: 63
End: 2023-11-20
Payer: MEDICARE

## 2023-11-20 DIAGNOSIS — M35.3 PMR (POLYMYALGIA RHEUMATICA) (MULTI): ICD-10-CM

## 2023-11-20 DIAGNOSIS — M32.9 SYSTEMIC LUPUS ERYTHEMATOSUS, UNSPECIFIED SLE TYPE, UNSPECIFIED ORGAN INVOLVEMENT STATUS (MULTI): ICD-10-CM

## 2023-11-20 LAB
ANION GAP SERPL CALC-SCNC: 11 MMOL/L (ref 10–20)
BASOPHILS # BLD AUTO: 0.04 X10*3/UL (ref 0–0.1)
BASOPHILS NFR BLD AUTO: 0.6 %
BUN SERPL-MCNC: 21 MG/DL (ref 6–23)
C3 SERPL-MCNC: 145 MG/DL (ref 87–200)
C4 SERPL-MCNC: 40 MG/DL (ref 10–50)
CALCIUM SERPL-MCNC: 9.9 MG/DL (ref 8.6–10.3)
CHLORIDE SERPL-SCNC: 103 MMOL/L (ref 98–107)
CO2 SERPL-SCNC: 29 MMOL/L (ref 21–32)
CREAT SERPL-MCNC: 0.88 MG/DL (ref 0.5–1.05)
CREAT UR-MCNC: 138.3 MG/DL (ref 20–320)
CRP SERPL-MCNC: 0.37 MG/DL
DSDNA AB SER-ACNC: 1 IU/ML
EOSINOPHIL # BLD AUTO: 0.11 X10*3/UL (ref 0–0.7)
EOSINOPHIL NFR BLD AUTO: 1.7 %
ERYTHROCYTE [DISTWIDTH] IN BLOOD BY AUTOMATED COUNT: 17 % (ref 11.5–14.5)
ERYTHROCYTE [SEDIMENTATION RATE] IN BLOOD BY WESTERGREN METHOD: 6 MM/H (ref 0–30)
GFR SERPL CREATININE-BSD FRML MDRD: 73 ML/MIN/1.73M*2
GLUCOSE SERPL-MCNC: 85 MG/DL (ref 74–99)
HCT VFR BLD AUTO: 39.8 % (ref 36–46)
HGB BLD-MCNC: 13.3 G/DL (ref 12–16)
IMM GRANULOCYTES # BLD AUTO: 0.01 X10*3/UL (ref 0–0.7)
IMM GRANULOCYTES NFR BLD AUTO: 0.2 % (ref 0–0.9)
LYMPHOCYTES # BLD AUTO: 2.07 X10*3/UL (ref 1.2–4.8)
LYMPHOCYTES NFR BLD AUTO: 32.2 %
MCH RBC QN AUTO: 31.1 PG (ref 26–34)
MCHC RBC AUTO-ENTMCNC: 33.4 G/DL (ref 32–36)
MCV RBC AUTO: 93 FL (ref 80–100)
MONOCYTES # BLD AUTO: 0.61 X10*3/UL (ref 0.1–1)
MONOCYTES NFR BLD AUTO: 9.5 %
NEUTROPHILS # BLD AUTO: 3.58 X10*3/UL (ref 1.2–7.7)
NEUTROPHILS NFR BLD AUTO: 55.8 %
NRBC BLD-RTO: 0 /100 WBCS (ref 0–0)
PLATELET # BLD AUTO: 319 X10*3/UL (ref 150–450)
POTASSIUM SERPL-SCNC: 4.6 MMOL/L (ref 3.5–5.3)
PROT UR-ACNC: 10 MG/DL (ref 5–24)
PROT/CREAT UR: 0.07 MG/MG CREAT (ref 0–0.17)
RBC # BLD AUTO: 4.28 X10*6/UL (ref 4–5.2)
SODIUM SERPL-SCNC: 138 MMOL/L (ref 136–145)
WBC # BLD AUTO: 6.4 X10*3/UL (ref 4.4–11.3)

## 2023-11-20 PROCEDURE — 85652 RBC SED RATE AUTOMATED: CPT

## 2023-11-20 PROCEDURE — 36415 COLL VENOUS BLD VENIPUNCTURE: CPT

## 2023-11-20 PROCEDURE — 86225 DNA ANTIBODY NATIVE: CPT

## 2023-11-20 PROCEDURE — 80048 BASIC METABOLIC PNL TOTAL CA: CPT

## 2023-11-20 PROCEDURE — 86140 C-REACTIVE PROTEIN: CPT

## 2023-11-20 PROCEDURE — 86160 COMPLEMENT ANTIGEN: CPT

## 2023-11-20 PROCEDURE — 85025 COMPLETE CBC W/AUTO DIFF WBC: CPT

## 2023-11-20 PROCEDURE — 82570 ASSAY OF URINE CREATININE: CPT

## 2023-11-20 PROCEDURE — 84156 ASSAY OF PROTEIN URINE: CPT

## 2023-12-06 ENCOUNTER — OFFICE VISIT (OUTPATIENT)
Dept: RHEUMATOLOGY | Facility: CLINIC | Age: 63
End: 2023-12-06
Payer: MEDICARE

## 2023-12-06 VITALS
TEMPERATURE: 97.1 F | WEIGHT: 175.4 LBS | HEIGHT: 63 IN | SYSTOLIC BLOOD PRESSURE: 138 MMHG | HEART RATE: 68 BPM | DIASTOLIC BLOOD PRESSURE: 86 MMHG | BODY MASS INDEX: 31.08 KG/M2 | OXYGEN SATURATION: 97 %

## 2023-12-06 DIAGNOSIS — M85.80 OSTEOPENIA, UNSPECIFIED LOCATION: ICD-10-CM

## 2023-12-06 DIAGNOSIS — E66.09 CLASS 1 OBESITY DUE TO EXCESS CALORIES WITHOUT SERIOUS COMORBIDITY WITH BODY MASS INDEX (BMI) OF 31.0 TO 31.9 IN ADULT: ICD-10-CM

## 2023-12-06 DIAGNOSIS — M32.9 SYSTEMIC LUPUS ERYTHEMATOSUS, UNSPECIFIED SLE TYPE, UNSPECIFIED ORGAN INVOLVEMENT STATUS (MULTI): ICD-10-CM

## 2023-12-06 DIAGNOSIS — M35.00 SJOGREN'S SYNDROME, WITH UNSPECIFIED ORGAN INVOLVEMENT (MULTI): ICD-10-CM

## 2023-12-06 DIAGNOSIS — M35.3 PMR (POLYMYALGIA RHEUMATICA) (MULTI): Primary | ICD-10-CM

## 2023-12-06 DIAGNOSIS — E66.9 OBESITY (BMI 30.0-34.9): ICD-10-CM

## 2023-12-06 PROCEDURE — 1159F MED LIST DOCD IN RCRD: CPT | Performed by: INTERNAL MEDICINE

## 2023-12-06 PROCEDURE — 99214 OFFICE O/P EST MOD 30 MIN: CPT | Performed by: INTERNAL MEDICINE

## 2023-12-06 PROCEDURE — 3008F BODY MASS INDEX DOCD: CPT | Performed by: INTERNAL MEDICINE

## 2023-12-06 PROCEDURE — 3075F SYST BP GE 130 - 139MM HG: CPT | Performed by: INTERNAL MEDICINE

## 2023-12-06 PROCEDURE — 1160F RVW MEDS BY RX/DR IN RCRD: CPT | Performed by: INTERNAL MEDICINE

## 2023-12-06 PROCEDURE — 1036F TOBACCO NON-USER: CPT | Performed by: INTERNAL MEDICINE

## 2023-12-06 PROCEDURE — 3079F DIAST BP 80-89 MM HG: CPT | Performed by: INTERNAL MEDICINE

## 2023-12-06 RX ORDER — ALENDRONATE SODIUM 70 MG/1
70 TABLET ORAL
Qty: 12 TABLET | Refills: 3 | Status: SHIPPED | OUTPATIENT
Start: 2023-12-06 | End: 2024-02-19 | Stop reason: SDUPTHER

## 2023-12-06 ASSESSMENT — ENCOUNTER SYMPTOMS
OCCASIONAL FEELINGS OF UNSTEADINESS: 0
DEPRESSION: 0
LOSS OF SENSATION IN FEET: 0

## 2023-12-06 ASSESSMENT — PATIENT HEALTH QUESTIONNAIRE - PHQ9
SUM OF ALL RESPONSES TO PHQ9 QUESTIONS 1 AND 2: 0
1. LITTLE INTEREST OR PLEASURE IN DOING THINGS: NOT AT ALL
2. FEELING DOWN, DEPRESSED OR HOPELESS: NOT AT ALL

## 2023-12-06 NOTE — PATIENT INSTRUCTIONS
Reduce Prednisone to 5 mg 2x daily.  Start alendronate 70 mg orally once weekly. Take this 30 minutes before eating for the day. Drink 6-8 ounces of water with this. Please make sure you sit upright for 30 minutes after taking.  Check labs 2/24: ESR, CRP, CBC with diff, BMP, spot urine protein to creatinine ratio.  Follow-up in 3 months.

## 2023-12-06 NOTE — PROGRESS NOTES
Subjective   Patient ID: Mojgan Villalobos is a 63 y.o. female who presents for Follow-up.    HPI 63-year-old female here for virtual follow-up regarding PMR. She started prednisone 10/15/23.  She initially started prednisone 10 mg 2x daily.  The pain and stiffness in her shoulders and hips resolved within 1 to 2 days of starting prednisone.  Dose was weaned to 7.5 in AM and 5 in PM 11/13/23.  She continues to deny headaches, jaw claudication or blurred vision.    She does note steroid side effects including sweating, hot flashes, excess energy, and insomnia.    She also has  SLE and Sjogren syndrome (SABINO 1:640, positive SSA and SSB antibodies, negative double-stranded DNA). She remains on hydroxychloroquine 300 mg daily, evoxac 30 mg 2 times daily, Systane and GenTeal drops for dry eyes.      She complained of pain and stiffness in her shoulders and hips which started June 2023.  She denied headaches, jaw claudication or blurred vision.  Labs done October 2023 were remarkable for normal ESR but CRP 1.54 (normal is less than 1).  She started prednisone 10 mg twice daily for presumptive diagnosis of PMR 10/15/23.  She felt nearly 100% better with 1 dose.     Cervical spine x-ray done March 2023 shows degenerative disc disease at C5-6, C6-7, C7-T1.  She is working with a chiropractor which has improved her range of motion.     She has started taking cevimeline 3 times daily-her dose around 7 PM. she does not like Biotene toothpaste.  Xylimelt discs did help with dry mouth.  She is working with her ophthalmologist Dr. Waldron regarding dry eyes. She did fail Xiidra. She is back on restasis, which she thinks works well. . She uses Refresh Optiv Jossue-3 drops (no preservative).     Last eye exam was December 2023 with Dr. Renner (includes OCT).     She gets dental exams every 4 months because of dry mouth.  She needs 2 new crowns due to dental decay.  She also needs a crown replaced on the left upper arch because of caries  "beneath the crown.  She notes that she is meticulous about her dental hygiene.  Her dentist is now also giving her fluoride treatments.     GERD improved with increasing Nexium to 40 mg daily.     She was told on dental exam June 2021 that bones appeared osteopenic.  DEXA done August 2021 shows femoral neck T score -1.0, normal total hip T score, normal lumbar spine T score.     She had Pfizer COVID-19 vaccine March 10, 2021 and March 31, 2021. She had booster 11/21. She had second booster May 2022 . She had Pfizer bivalent booster November 12, 2022.  She had flu vaccine October 12/23.  She had Pfizer COVID vaccine 11/15/23.     Labs November 2022: Cholesterol 209, HDL 72, , triglycerides 69, CMP normal, CBC normal except white blood cell count 3.0, TSH 1.93, spot urine protein to creatinine ratio 0.11  March 2023:CBC Normal, CMP normal, ESR 29, CRP 0.7 (less than 1), double-stranded DNA negative, C3 and C4 normal, spot urine creatinine ratio 0.1  Labs July 2023: CBC normal, BMP normal except glucose 101, ESR 9, spot urine protein to creatinine ratio 0.1  Labs October 2023: ESR 12, CRP 1.54 (normal less than 1)     Cervical spine x-ray 3/23: DJD C5-6, C6-7, C7-T1     DEXA 8/21: T score -1.0 left femoral neck, T score normal total hip, T score normal lumbar spine  DEXA October 2023: T score -1.3 left total hip, T score -1.5 left femoral neck, T score normal lumbar spine.  Estimated 10-year fracture risk by FRAX is 1.6% for hip fracture and 14.4% for major osteoporotic fracture.     EKG 10/22: QTc 470 ms     ROS:  General: Denies fevers or chills.  CV: Denies chest pain or palpitations.  Denies leg edema.  Lungs: Denies coughing or shortness of breath.  Skin: Denies rashes or nodules.  MS: Denies joint pain or joint swelling.       Objective   BP (!) 146/98   Pulse 68   Temp 36.2 °C (97.1 °F)   Ht 1.6 m (5' 3\")   Wt 79.6 kg (175 lb 6.4 oz)   SpO2 97%   BMI 31.07 kg/m²     Physical Exam  Physical " Exam  HEENT: PERRL, EOMI. Temporal arteries nontender.  Neck: Supple, no nodes.  CV: RRR, no MGR.  Lungs: Clear, no rales or wheezes.  Abdomen: Soft, nontender. No hepatosplenomegaly.  Extremities:  No cyanosis, clubbing, or edema.   MS: No synovitis.  Skin: No rashes or nodules.          Assessment/Plan   Problem List Items Addressed This Visit             ICD-10-CM    Sjogrens syndrome (CMS/[facility]) M35.00    Systemic lupus erythematosus (CMS/[facility]) M32.9    PMR (polymyalgia rheumatica) (CMS/[facility]) - Primary M35.3     Problem List Items Addressed This Visit             ICD-10-CM    Obesity (BMI 30.0-34.9) E66.9    Osteopenia M85.80    Relevant Medications    alendronate (Fosamax) 70 mg tablet    Sjogrens syndrome (CMS/[facility]) M35.00    Systemic lupus erythematosus (CMS/[facility]) M32.9    Relevant Orders    Basic Metabolic Panel    CBC and Auto Differential    Protein, Urine Random    Creatinine, urine, random    Class 1 obesity with body mass index (BMI) of 31.0 to 31.9 in adult E66.9, Z68.31    PMR (polymyalgia rheumatica) (CMS/[facility]) - Primary M35.3    Relevant Orders    Sedimentation Rate    C-reactive protein       1. SLE : Currently stable.     2. Sjogren's syndrome- reasonably stable. Dry eyes have improved with use of Restasis.   Xylimelt helps. Cevimilene 3x daily helps. Dentist is now doing fluoride treatments.   Sees dentist every 4 months.     3.  Osteopenia-currently on prednisone for PMR.  Estimated 10-year fracture risk by FRAX is 1.6% for hip fracture and 14.4% for major osteoporotic fracture.  I recommend starting treatment with alendronate . warned of risk of esophagitis, ONJ, and atypical hip fractures. Advised on proper dosing instructions    4. GERD-controlled on Nexium 40 mg daily.     5. Fibromyalgia- She stopped gabapentin.     6. BMI 31- stable. She will continue to work on weight loss.     7. Cervical spondylosis-advised on gentle range of motion exercises. She can continue with chiropractic care since she  finds it beneficial.     8.  PMR-symptoms began in June 2023.  CRP was elevated at 1.5 4 October 2023.  Symptoms resolved within 24 hours of taking prednisone 10 mg twice daily.  Prednisone was reduced to 7.5 mg in morning and 5 mg in evening November 13, 2023.  She is however still having some steroid side effects.    Plan:  Reduce Prednisone to 5 mg 2x daily.  Start alendronate 70 mg orally once weekly. Take this 30 minutes before eating for the day. Drink 6-8 ounces of water with this. Please make sure you sit upright for 30 minutes after taking.  Check labs 2/24: ESR, CRP, CBC with diff, BMP, spot urine protein to creatinine ratio.  Follow-up in 3 months.

## 2024-01-19 ENCOUNTER — PATIENT MESSAGE (OUTPATIENT)
Dept: PRIMARY CARE | Facility: CLINIC | Age: 64
End: 2024-01-19
Payer: MEDICARE

## 2024-01-23 DIAGNOSIS — F51.01 PRIMARY INSOMNIA: ICD-10-CM

## 2024-01-23 RX ORDER — ZOLPIDEM TARTRATE 10 MG/1
10 TABLET ORAL DAILY
Qty: 90 TABLET | Refills: 1 | Status: SHIPPED | OUTPATIENT
Start: 2024-01-23 | End: 2024-04-15 | Stop reason: SDUPTHER

## 2024-01-25 ENCOUNTER — APPOINTMENT (OUTPATIENT)
Dept: PRIMARY CARE | Facility: CLINIC | Age: 64
End: 2024-01-25
Payer: MEDICARE

## 2024-02-13 ENCOUNTER — LAB (OUTPATIENT)
Dept: LAB | Facility: LAB | Age: 64
End: 2024-02-13
Payer: MEDICARE

## 2024-02-13 DIAGNOSIS — M35.3 PMR (POLYMYALGIA RHEUMATICA) (MULTI): ICD-10-CM

## 2024-02-13 DIAGNOSIS — M32.9 SYSTEMIC LUPUS ERYTHEMATOSUS, UNSPECIFIED SLE TYPE, UNSPECIFIED ORGAN INVOLVEMENT STATUS (MULTI): ICD-10-CM

## 2024-02-13 LAB
ANION GAP SERPL CALC-SCNC: 10 MMOL/L (ref 10–20)
BASOPHILS # BLD AUTO: 0.06 X10*3/UL (ref 0–0.1)
BASOPHILS NFR BLD AUTO: 1 %
BUN SERPL-MCNC: 19 MG/DL (ref 6–23)
CALCIUM SERPL-MCNC: 9.4 MG/DL (ref 8.6–10.3)
CHLORIDE SERPL-SCNC: 104 MMOL/L (ref 98–107)
CO2 SERPL-SCNC: 30 MMOL/L (ref 21–32)
CREAT SERPL-MCNC: 0.87 MG/DL (ref 0.5–1.05)
CREAT UR-MCNC: 107.1 MG/DL (ref 20–320)
CRP SERPL-MCNC: 0.43 MG/DL
EGFRCR SERPLBLD CKD-EPI 2021: 74 ML/MIN/1.73M*2
EOSINOPHIL # BLD AUTO: 0.15 X10*3/UL (ref 0–0.7)
EOSINOPHIL NFR BLD AUTO: 2.4 %
ERYTHROCYTE [DISTWIDTH] IN BLOOD BY AUTOMATED COUNT: 14 % (ref 11.5–14.5)
ERYTHROCYTE [SEDIMENTATION RATE] IN BLOOD BY WESTERGREN METHOD: 10 MM/H (ref 0–30)
GLUCOSE SERPL-MCNC: 75 MG/DL (ref 74–99)
HCT VFR BLD AUTO: 40.6 % (ref 36–46)
HGB BLD-MCNC: 13.1 G/DL (ref 12–16)
IMM GRANULOCYTES # BLD AUTO: 0.01 X10*3/UL (ref 0–0.7)
IMM GRANULOCYTES NFR BLD AUTO: 0.2 % (ref 0–0.9)
LYMPHOCYTES # BLD AUTO: 1.95 X10*3/UL (ref 1.2–4.8)
LYMPHOCYTES NFR BLD AUTO: 31.7 %
MCH RBC QN AUTO: 30.5 PG (ref 26–34)
MCHC RBC AUTO-ENTMCNC: 32.3 G/DL (ref 32–36)
MCV RBC AUTO: 95 FL (ref 80–100)
MONOCYTES # BLD AUTO: 0.6 X10*3/UL (ref 0.1–1)
MONOCYTES NFR BLD AUTO: 9.7 %
NEUTROPHILS # BLD AUTO: 3.39 X10*3/UL (ref 1.2–7.7)
NEUTROPHILS NFR BLD AUTO: 55 %
NRBC BLD-RTO: 0 /100 WBCS (ref 0–0)
PLATELET # BLD AUTO: 292 X10*3/UL (ref 150–450)
POTASSIUM SERPL-SCNC: 4.4 MMOL/L (ref 3.5–5.3)
PROT UR-ACNC: 18 MG/DL (ref 5–24)
PROT/CREAT UR: 0.17 MG/MG CREAT (ref 0–0.17)
RBC # BLD AUTO: 4.29 X10*6/UL (ref 4–5.2)
SODIUM SERPL-SCNC: 140 MMOL/L (ref 136–145)
WBC # BLD AUTO: 6.2 X10*3/UL (ref 4.4–11.3)

## 2024-02-13 PROCEDURE — 84156 ASSAY OF PROTEIN URINE: CPT

## 2024-02-13 PROCEDURE — 82570 ASSAY OF URINE CREATININE: CPT

## 2024-02-13 PROCEDURE — 86140 C-REACTIVE PROTEIN: CPT

## 2024-02-13 PROCEDURE — 36415 COLL VENOUS BLD VENIPUNCTURE: CPT

## 2024-02-13 PROCEDURE — 85025 COMPLETE CBC W/AUTO DIFF WBC: CPT

## 2024-02-13 PROCEDURE — 85652 RBC SED RATE AUTOMATED: CPT

## 2024-02-13 PROCEDURE — 80048 BASIC METABOLIC PNL TOTAL CA: CPT

## 2024-02-19 DIAGNOSIS — M85.80 OSTEOPENIA, UNSPECIFIED LOCATION: ICD-10-CM

## 2024-02-19 DIAGNOSIS — M32.9 SYSTEMIC LUPUS ERYTHEMATOSUS, UNSPECIFIED SLE TYPE, UNSPECIFIED ORGAN INVOLVEMENT STATUS (MULTI): ICD-10-CM

## 2024-02-19 DIAGNOSIS — M35.00 SJOGREN'S SYNDROME, WITH UNSPECIFIED ORGAN INVOLVEMENT (MULTI): Primary | ICD-10-CM

## 2024-02-19 DIAGNOSIS — I10 ESSENTIAL HYPERTENSION: ICD-10-CM

## 2024-02-19 RX ORDER — ALENDRONATE SODIUM 70 MG/1
70 TABLET ORAL
Qty: 12 TABLET | Refills: 3 | Status: SHIPPED | OUTPATIENT
Start: 2024-02-19 | End: 2025-02-18

## 2024-02-19 RX ORDER — AMLODIPINE BESYLATE 5 MG/1
5 TABLET ORAL DAILY
Qty: 90 TABLET | Refills: 1 | Status: SHIPPED | OUTPATIENT
Start: 2024-02-19

## 2024-02-19 RX ORDER — ALENDRONATE SODIUM 70 MG/1
70 TABLET ORAL
Qty: 12 TABLET | Refills: 3 | Status: CANCELLED | OUTPATIENT
Start: 2024-02-19 | End: 2025-02-18

## 2024-02-19 RX ORDER — HYDROXYCHLOROQUINE SULFATE 200 MG/1
300 TABLET, FILM COATED ORAL DAILY
Qty: 135 TABLET | Refills: 3 | Status: SHIPPED | OUTPATIENT
Start: 2024-02-19

## 2024-02-19 RX ORDER — CEVIMELINE HYDROCHLORIDE 30 MG/1
30 CAPSULE ORAL 2 TIMES DAILY
Qty: 90 CAPSULE | Refills: 3 | Status: CANCELLED | OUTPATIENT
Start: 2024-02-19

## 2024-02-19 RX ORDER — LOSARTAN POTASSIUM 100 MG/1
100 TABLET ORAL DAILY
Qty: 90 TABLET | Refills: 1 | Status: SHIPPED | OUTPATIENT
Start: 2024-02-19

## 2024-02-19 RX ORDER — CEVIMELINE HYDROCHLORIDE 30 MG/1
30 CAPSULE ORAL 3 TIMES DAILY
Qty: 270 CAPSULE | Refills: 3 | Status: SHIPPED | OUTPATIENT
Start: 2024-02-19

## 2024-02-19 RX ORDER — HYDROXYCHLOROQUINE SULFATE 200 MG/1
200 TABLET, FILM COATED ORAL DAILY
Qty: 90 TABLET | Refills: 1 | Status: CANCELLED | OUTPATIENT
Start: 2024-02-19

## 2024-03-07 ENCOUNTER — OFFICE VISIT (OUTPATIENT)
Dept: RHEUMATOLOGY | Facility: CLINIC | Age: 64
End: 2024-03-07
Payer: MEDICARE

## 2024-03-07 VITALS
HEIGHT: 63 IN | BODY MASS INDEX: 30.09 KG/M2 | HEART RATE: 80 BPM | DIASTOLIC BLOOD PRESSURE: 78 MMHG | OXYGEN SATURATION: 98 % | SYSTOLIC BLOOD PRESSURE: 120 MMHG | TEMPERATURE: 97.5 F | WEIGHT: 169.8 LBS

## 2024-03-07 DIAGNOSIS — M32.9 SYSTEMIC LUPUS ERYTHEMATOSUS, UNSPECIFIED SLE TYPE, UNSPECIFIED ORGAN INVOLVEMENT STATUS (MULTI): Primary | ICD-10-CM

## 2024-03-07 DIAGNOSIS — M35.00 SJOGREN'S SYNDROME, WITH UNSPECIFIED ORGAN INVOLVEMENT (MULTI): ICD-10-CM

## 2024-03-07 DIAGNOSIS — M35.3 PMR (POLYMYALGIA RHEUMATICA) (MULTI): ICD-10-CM

## 2024-03-07 PROCEDURE — 1160F RVW MEDS BY RX/DR IN RCRD: CPT | Performed by: INTERNAL MEDICINE

## 2024-03-07 PROCEDURE — 3008F BODY MASS INDEX DOCD: CPT | Performed by: INTERNAL MEDICINE

## 2024-03-07 PROCEDURE — 3078F DIAST BP <80 MM HG: CPT | Performed by: INTERNAL MEDICINE

## 2024-03-07 PROCEDURE — 3074F SYST BP LT 130 MM HG: CPT | Performed by: INTERNAL MEDICINE

## 2024-03-07 PROCEDURE — 1159F MED LIST DOCD IN RCRD: CPT | Performed by: INTERNAL MEDICINE

## 2024-03-07 PROCEDURE — 1036F TOBACCO NON-USER: CPT | Performed by: INTERNAL MEDICINE

## 2024-03-07 PROCEDURE — 99214 OFFICE O/P EST MOD 30 MIN: CPT | Performed by: INTERNAL MEDICINE

## 2024-03-07 ASSESSMENT — PATIENT HEALTH QUESTIONNAIRE - PHQ9
1. LITTLE INTEREST OR PLEASURE IN DOING THINGS: NOT AT ALL
SUM OF ALL RESPONSES TO PHQ9 QUESTIONS 1 AND 2: 0
2. FEELING DOWN, DEPRESSED OR HOPELESS: NOT AT ALL

## 2024-03-07 NOTE — PROGRESS NOTES
Subjective   Patient ID: Mojgan Villalobos is a 63 y.o. female who presents for Follow-up.    HPI 63-year-old female here for  follow-up regarding PMR (onset October 2023 with CRP 1.54). She started prednisone 10/15/23.  She initially started prednisone 10 mg 2x daily.  The pain and stiffness in her shoulders and hips resolved within 1 to 2 days of starting prednisone.  Dose was weaned to 5 in AM and 5 in PM 11/13/23.  She continues to deny headaches, jaw claudication or blurred vision.    Prednisone was reduced to 5 mg 2x daily.  She tried reducing to 7.5 mg daily 1 month ago, but she had recurrence of her PMR symptoms.    She also has  SLE and Sjogren syndrome (SABINO 1:640, positive SSA and SSB antibodies, negative double-stranded DNA). She remains on hydroxychloroquine 300 mg daily, evoxac 30 mg 2 times daily, Systane and GenTeal drops for dry eyes.      Cervical spine x-ray done March 2023 shows degenerative disc disease at C5-6, C6-7, C7-T1.  She is working with a chiropractor which has improved her range of motion.     She did not like Biotene toothpaste.  Xylimelt discs did help with dry mouth.  She is working with her ophthalmologist Dr. Waldron regarding dry eyes. She did fail Xiidra. She is back on restasis, which she thinks works well. . She uses Refresh Optiv Jossue-3 drops (no preservative).     Last eye exam was December 2023 with Dr. Renner (includes OCT).     She gets dental exams every 4 months because of dry mouth.  She needs 2 new crowns due to dental decay.  She also needs a crown replaced on the left upper arch because of caries beneath the crown.  She notes that she is meticulous about her dental hygiene.  Her dentist is now also giving her fluoride treatments.     GERD improved with increasing Nexium to 40 mg daily.     She was told on dental exam June 2021 that bones appeared osteopenic.  DEXA done August 2021 shows femoral neck T score -1.0, normal total hip T score, normal lumbar spine T score.    "  She had Pfizer COVID-19 vaccine March 10, 2021 and March 31, 2021. She had booster 11/21. She had second booster May 2022 . She had Pfizer bivalent booster November 12, 2022.  She had flu vaccine October 12/23.  She had Pfizer COVID vaccine 11/15/23.  She had Prevnar 20 October 2023.     Labs November 2022: Cholesterol 209, HDL 72, , triglycerides 69, CMP normal, CBC normal except white blood cell count 3.0, TSH 1.93, spot urine protein to creatinine ratio 0.11  March 2023:CBC Normal, CMP normal, ESR 29, CRP 0.7 (less than 1), double-stranded DNA negative, C3 and C4 normal, spot urine creatinine ratio 0.1  Labs July 2023: CBC normal, BMP normal except glucose 101, ESR 9, spot urine protein to creatinine ratio 0.1  Labs October 2023: ESR 12, CRP 1.54 (normal less than 1)  Labs February 2024: ESR 10, CRP 0.43 (less than 1), CBC and BMP normal, spot urine protein to creatinine ratio 0.17     Cervical spine x-ray 3/23: DJD C5-6, C6-7, C7-T1     DEXA 8/21: T score -1.0 left femoral neck, T score normal total hip, T score normal lumbar spine  DEXA October 2023: T score -1.3 left total hip, T score -1.5 left femoral neck, T score normal lumbar spine.  Estimated 10-year fracture risk by FRAX is 1.6% for hip fracture and 14.4% for major osteoporotic fracture.     EKG 10/22: QTc 470 ms     ROS:  General: Denies fevers or chills.  CV: Denies chest pain or palpitations.  Denies leg edema.  Lungs: Denies coughing or shortness of breath.  Skin: Denies rashes or nodules.  MS: Denies joint pain or joint swelling.       Objective   /78 (BP Location: Left arm, Patient Position: Sitting, BP Cuff Size: Small adult)   Pulse 80   Temp 36.4 °C (97.5 °F)   Ht 1.6 m (5' 3\")   Wt 77 kg (169 lb 12.8 oz)   SpO2 98%   BMI 30.08 kg/m²     Physical Exam  Physical Exam  HEENT: PERRL, EOMI. Temporal arteries nontender.  Neck: Supple, no nodes.  CV: RRR, no MGR.  Lungs: Clear, no rales or wheezes.  Abdomen: Soft, nontender. No " hepatosplenomegaly.  Extremities:  No cyanosis, clubbing, or edema.   MS: No synovitis.  Skin: No rashes or nodules.          Assessment/Plan   Problem List Items Addressed This Visit             ICD-10-CM    Sjogrens syndrome (CMS/Formerly [facility]) M35.00    Systemic lupus erythematosus (CMS/Formerly [facility]) - Primary M32.9    Relevant Orders    CBC and Auto Differential    Basic Metabolic Panel    C3 Complement    C4 Complement    Anti-DNA Antibody, Double-Stranded    Sedimentation Rate    Protein, Urine Random    Creatinine, urine, random    PMR (polymyalgia rheumatica) (CMS/Formerly [facility]) M35.3    Relevant Orders    C-reactive protein    BMI 30.0-30.9,adult Z68.30           1. SLE : Currently stable.     2. Sjogren's syndrome- reasonably stable. Dry eyes have improved with use of Restasis.   Xylimelt helps. Cevimilene 3x daily helps. Dentist is now doing fluoride treatments.   Sees dentist every 4 months.     3.  Osteopenia-currently on prednisone for PMR.  Estimated 10-year fracture risk by FRAX is 1.6% for hip fracture and 14.4% for major osteoporotic fracture.  She started alendronate December 2023.    4. GERD-controlled on Nexium 40 mg daily.     5. Fibromyalgia- She stopped gabapentin.     6. BMI 30- improved. . She will continue to work on weight loss.     7. Cervical spondylosis-advised on gentle range of motion exercises. She can continue with chiropractic care since she finds it beneficial.     8.  PMR-symptoms began in June 2023.  CRP was elevated at 1.5 4 October 2023.  Symptoms resolved within 24 hours of taking prednisone 10 mg twice daily.  Prednisone was reduced to 5 mg twice daily December 2023.  She tried tapering to 7.5 mg daily 1 month ago, experienced some recurrent PMR symptoms.  I advised trying to reduce to prednisone 5 mg twice daily again.  If symptoms recur, I will try to reduce prednisone to 9 mg daily.    Plan:  Reduce prednisone to 5 mg in AM and 2.5 mg in PM.  Call back if PMR symptoms recur, at which time we would  need to try reducing to just 9 mg daily.  Check labs 6/24: CBC with diff, BMP, dsDNA, C3, C4, ESR, CRP, spot urine protein to creatinine ratio.  Follow-up in 3 months.

## 2024-03-07 NOTE — PATIENT INSTRUCTIONS
Reduce prednisone to 5 mg in AM and 2.5 mg in PM.  Check labs 6/24: CBC with diff, BMP, dsDNA, C3, C4, ESR, CRP, spot urine protein to creatinine ratio.  Follow-up in 3 months.

## 2024-03-16 DIAGNOSIS — M35.3 PMR (POLYMYALGIA RHEUMATICA) (MULTI): ICD-10-CM

## 2024-03-18 ENCOUNTER — OFFICE VISIT (OUTPATIENT)
Dept: PRIMARY CARE | Facility: CLINIC | Age: 64
End: 2024-03-18
Payer: MEDICARE

## 2024-03-18 VITALS
TEMPERATURE: 97 F | SYSTOLIC BLOOD PRESSURE: 160 MMHG | BODY MASS INDEX: 30.3 KG/M2 | RESPIRATION RATE: 14 BRPM | HEIGHT: 63 IN | DIASTOLIC BLOOD PRESSURE: 110 MMHG | OXYGEN SATURATION: 99 % | HEART RATE: 72 BPM | WEIGHT: 171 LBS

## 2024-03-18 DIAGNOSIS — K64.9 HEMORRHOIDS, UNSPECIFIED HEMORRHOID TYPE: Primary | ICD-10-CM

## 2024-03-18 PROCEDURE — 3080F DIAST BP >= 90 MM HG: CPT | Performed by: NURSE PRACTITIONER

## 2024-03-18 PROCEDURE — 3008F BODY MASS INDEX DOCD: CPT | Performed by: NURSE PRACTITIONER

## 2024-03-18 PROCEDURE — 1036F TOBACCO NON-USER: CPT | Performed by: NURSE PRACTITIONER

## 2024-03-18 PROCEDURE — 1159F MED LIST DOCD IN RCRD: CPT | Performed by: NURSE PRACTITIONER

## 2024-03-18 PROCEDURE — 1160F RVW MEDS BY RX/DR IN RCRD: CPT | Performed by: NURSE PRACTITIONER

## 2024-03-18 PROCEDURE — 99213 OFFICE O/P EST LOW 20 MIN: CPT | Performed by: NURSE PRACTITIONER

## 2024-03-18 PROCEDURE — 3077F SYST BP >= 140 MM HG: CPT | Performed by: NURSE PRACTITIONER

## 2024-03-18 RX ORDER — PREDNISONE 5 MG/1
TABLET ORAL
Qty: 60 TABLET | Refills: 3 | Status: SHIPPED | OUTPATIENT
Start: 2024-03-18

## 2024-03-18 RX ORDER — HYDROCORTISONE ACETATE 25 MG/1
25 SUPPOSITORY RECTAL DAILY PRN
Qty: 7 SUPPOSITORY | Refills: 0 | Status: SHIPPED | OUTPATIENT
Start: 2024-03-18 | End: 2024-03-25

## 2024-03-18 NOTE — ASSESSMENT & PLAN NOTE
If [facility] returns will send to GI for colonoscopy early. Not due until 2025.  Anusol suppositories ordered

## 2024-03-18 NOTE — PROGRESS NOTES
"Subjective   Patient ID: Mojgan Villalobos is a 63 y.o. female who presents for Hemorrhoids.    Patient of Dr. Barrios.    States 2 weeks ago she had a stomach virus and was vomiting and had a lot of diarrhea.  After this she developed bright red bleeding per rectum.   Initially was small amount and was on the toilet tissue.   Once BM started to firm up she was pushing and had a lot of blood in the toilet. This occurred over 3 different Bms.   Has not stopped. No bleeding since Friday. Diarrhea has resolved.    Last colonoscopy 2020 had internal and external hemorrhoids.        Review of Systems  otherwise negative aside from what was mentioned above in HPI.    Objective   BP (!) 160/110   Pulse 72   Temp 36.1 °C (97 °F) (Temporal)   Resp 14   Ht 1.6 m (5' 3\")   Wt 77.6 kg (171 lb)   SpO2 99%   BMI 30.29 kg/m²     Physical Exam  Constitutional:       Appearance: Normal appearance.   Cardiovascular:      Rate and Rhythm: Normal rate and regular rhythm.   Pulmonary:      Effort: Pulmonary effort is normal.      Breath sounds: Normal breath sounds.   Abdominal:      General: Abdomen is flat. Bowel sounds are normal. There is no distension.      Palpations: Abdomen is soft. There is no mass.      Tenderness: There is no abdominal tenderness. There is no guarding.   Neurological:      General: No focal deficit present.      Mental Status: She is alert and oriented to person, place, and time. Mental status is at baseline.   Psychiatric:         Mood and Affect: Mood normal.         Behavior: Behavior normal.         Thought Content: Thought content normal.         Judgment: Judgment normal.         Assessment/Plan   Problem List Items Addressed This Visit             ICD-10-CM    Hemorrhoids - Primary (Chronic) K64.9     If [facility] returns will send to GI for colonoscopy early. Not due until 2025.  Anusol suppositories ordered         Relevant Medications    hydrocortisone (Anusol-HC) 25 mg suppository          "

## 2024-04-15 ENCOUNTER — OFFICE VISIT (OUTPATIENT)
Dept: PRIMARY CARE | Facility: CLINIC | Age: 64
End: 2024-04-15
Payer: MEDICARE

## 2024-04-15 VITALS
SYSTOLIC BLOOD PRESSURE: 132 MMHG | HEART RATE: 69 BPM | BODY MASS INDEX: 30.65 KG/M2 | DIASTOLIC BLOOD PRESSURE: 84 MMHG | WEIGHT: 173 LBS | HEIGHT: 63 IN | OXYGEN SATURATION: 98 % | TEMPERATURE: 96.8 F

## 2024-04-15 DIAGNOSIS — Z12.31 ENCOUNTER FOR SCREENING MAMMOGRAM FOR MALIGNANT NEOPLASM OF BREAST: ICD-10-CM

## 2024-04-15 DIAGNOSIS — M79.7 FIBROMYALGIA: ICD-10-CM

## 2024-04-15 DIAGNOSIS — K21.9 GASTROESOPHAGEAL REFLUX DISEASE WITHOUT ESOPHAGITIS: ICD-10-CM

## 2024-04-15 DIAGNOSIS — I10 ESSENTIAL HYPERTENSION: ICD-10-CM

## 2024-04-15 DIAGNOSIS — F51.01 PRIMARY INSOMNIA: ICD-10-CM

## 2024-04-15 DIAGNOSIS — Z13.6 SCREENING FOR CARDIOVASCULAR CONDITION: Primary | ICD-10-CM

## 2024-04-15 DIAGNOSIS — R06.00 DYSPNEA, UNSPECIFIED TYPE: ICD-10-CM

## 2024-04-15 PROCEDURE — 3079F DIAST BP 80-89 MM HG: CPT | Performed by: INTERNAL MEDICINE

## 2024-04-15 PROCEDURE — 99214 OFFICE O/P EST MOD 30 MIN: CPT | Performed by: INTERNAL MEDICINE

## 2024-04-15 PROCEDURE — 3075F SYST BP GE 130 - 139MM HG: CPT | Performed by: INTERNAL MEDICINE

## 2024-04-15 PROCEDURE — G2211 COMPLEX E/M VISIT ADD ON: HCPCS | Performed by: INTERNAL MEDICINE

## 2024-04-15 PROCEDURE — 1036F TOBACCO NON-USER: CPT | Performed by: INTERNAL MEDICINE

## 2024-04-15 PROCEDURE — 3008F BODY MASS INDEX DOCD: CPT | Performed by: INTERNAL MEDICINE

## 2024-04-15 PROCEDURE — 1160F RVW MEDS BY RX/DR IN RCRD: CPT | Performed by: INTERNAL MEDICINE

## 2024-04-15 PROCEDURE — 1159F MED LIST DOCD IN RCRD: CPT | Performed by: INTERNAL MEDICINE

## 2024-04-15 RX ORDER — ZOLPIDEM TARTRATE 10 MG/1
10 TABLET ORAL DAILY
Qty: 90 TABLET | Refills: 1 | Status: SHIPPED | OUTPATIENT
Start: 2024-04-15 | End: 2024-04-17 | Stop reason: SDUPTHER

## 2024-04-15 ASSESSMENT — ENCOUNTER SYMPTOMS
APPETITE CHANGE: 0
COUGH: 0
ACTIVITY CHANGE: 0
PALPITATIONS: 0
SHORTNESS OF BREATH: 0

## 2024-04-15 NOTE — PROGRESS NOTES
Subjective   Patient ID: Mojgan Villalobos is a 63 y.o. female who presents for Follow-up.  Feels well  PMR is improving  Zolpidem is working for insomnia  Prednisone interrupts her sleep and she is anxious to get off          Review of Systems   Constitutional:  Negative for activity change and appetite change.   Respiratory:  Negative for cough and shortness of breath.    Cardiovascular:  Negative for chest pain, palpitations and leg swelling.       Objective   Vitals:    04/15/24 0902   BP: 132/84   Pulse:    Temp:    SpO2:      Physical Exam  Vitals and nursing note reviewed.   Cardiovascular:      Rate and Rhythm: Normal rate and regular rhythm.   Pulmonary:      Effort: Pulmonary effort is normal.      Breath sounds: Normal breath sounds.         Assessment/Plan   Problem List Items Addressed This Visit       Essential hypertension    Current Assessment & Plan     Stable         GERD (gastroesophageal reflux disease)    Insomnia    Relevant Medications    zolpidem (Ambien) 10 mg tablet    Fibromyalgia    Relevant Orders    Tsh With Reflex To Free T4 If Abnormal     Other Visit Diagnoses       Screening for cardiovascular condition    -  Primary    Relevant Orders    CT cardiac scoring wo IV contrast    Lipid Panel    Tsh With Reflex To Free T4 If Abnormal    Dyspnea, unspecified type        Relevant Orders    XR chest 2 views    Encounter for screening mammogram for malignant neoplasm of breast        Relevant Orders    BI mammo bilateral screening tomosynthesis        Follow-up with me in 6 months for wellness visit

## 2024-04-17 DIAGNOSIS — F51.01 PRIMARY INSOMNIA: ICD-10-CM

## 2024-04-17 RX ORDER — ZOLPIDEM TARTRATE 10 MG/1
10 TABLET ORAL DAILY
Qty: 90 TABLET | Refills: 0 | Status: SHIPPED | OUTPATIENT
Start: 2024-04-17 | End: 2024-07-16

## 2024-04-30 ENCOUNTER — HOSPITAL ENCOUNTER (OUTPATIENT)
Dept: RADIOLOGY | Facility: CLINIC | Age: 64
Discharge: HOME | End: 2024-04-30
Payer: MEDICARE

## 2024-04-30 DIAGNOSIS — R06.00 DYSPNEA, UNSPECIFIED TYPE: ICD-10-CM

## 2024-04-30 DIAGNOSIS — Z13.6 SCREENING FOR CARDIOVASCULAR CONDITION: ICD-10-CM

## 2024-04-30 PROCEDURE — 71046 X-RAY EXAM CHEST 2 VIEWS: CPT | Performed by: RADIOLOGY

## 2024-04-30 PROCEDURE — 71046 X-RAY EXAM CHEST 2 VIEWS: CPT

## 2024-04-30 PROCEDURE — 75571 CT HRT W/O DYE W/CA TEST: CPT

## 2024-06-10 ENCOUNTER — APPOINTMENT (OUTPATIENT)
Dept: RHEUMATOLOGY | Facility: CLINIC | Age: 64
End: 2024-06-10
Payer: MEDICARE

## 2024-06-14 ENCOUNTER — LAB (OUTPATIENT)
Dept: LAB | Facility: LAB | Age: 64
End: 2024-06-14
Payer: MEDICARE

## 2024-06-14 DIAGNOSIS — M35.3 PMR (POLYMYALGIA RHEUMATICA) (MULTI): ICD-10-CM

## 2024-06-14 DIAGNOSIS — M32.9 SYSTEMIC LUPUS ERYTHEMATOSUS, UNSPECIFIED SLE TYPE, UNSPECIFIED ORGAN INVOLVEMENT STATUS (MULTI): ICD-10-CM

## 2024-06-14 LAB
ANION GAP SERPL CALC-SCNC: 11 MMOL/L (ref 10–20)
BASOPHILS # BLD AUTO: 0.06 X10*3/UL (ref 0–0.1)
BASOPHILS NFR BLD AUTO: 1.2 %
BUN SERPL-MCNC: 15 MG/DL (ref 6–23)
C3 SERPL-MCNC: 138 MG/DL (ref 87–200)
C4 SERPL-MCNC: 37 MG/DL (ref 10–50)
CALCIUM SERPL-MCNC: 9.6 MG/DL (ref 8.6–10.6)
CHLORIDE SERPL-SCNC: 104 MMOL/L (ref 98–107)
CO2 SERPL-SCNC: 27 MMOL/L (ref 21–32)
CREAT SERPL-MCNC: 0.74 MG/DL (ref 0.5–1.05)
CREAT UR-MCNC: 72.9 MG/DL (ref 20–320)
CRP SERPL-MCNC: 0.25 MG/DL
DSDNA AB SER-ACNC: 2 IU/ML
EGFRCR SERPLBLD CKD-EPI 2021: 90 ML/MIN/1.73M*2
EOSINOPHIL # BLD AUTO: 0.12 X10*3/UL (ref 0–0.7)
EOSINOPHIL NFR BLD AUTO: 2.4 %
ERYTHROCYTE [DISTWIDTH] IN BLOOD BY AUTOMATED COUNT: 14.5 % (ref 11.5–14.5)
ERYTHROCYTE [SEDIMENTATION RATE] IN BLOOD BY WESTERGREN METHOD: 9 MM/H (ref 0–30)
GLUCOSE SERPL-MCNC: 77 MG/DL (ref 74–99)
HCT VFR BLD AUTO: 37.8 % (ref 36–46)
HGB BLD-MCNC: 12.1 G/DL (ref 12–16)
IMM GRANULOCYTES # BLD AUTO: 0.02 X10*3/UL (ref 0–0.7)
IMM GRANULOCYTES NFR BLD AUTO: 0.4 % (ref 0–0.9)
LYMPHOCYTES # BLD AUTO: 1.9 X10*3/UL (ref 1.2–4.8)
LYMPHOCYTES NFR BLD AUTO: 37.4 %
MCH RBC QN AUTO: 30.3 PG (ref 26–34)
MCHC RBC AUTO-ENTMCNC: 32 G/DL (ref 32–36)
MCV RBC AUTO: 95 FL (ref 80–100)
MONOCYTES # BLD AUTO: 0.56 X10*3/UL (ref 0.1–1)
MONOCYTES NFR BLD AUTO: 11 %
NEUTROPHILS # BLD AUTO: 2.42 X10*3/UL (ref 1.2–7.7)
NEUTROPHILS NFR BLD AUTO: 47.6 %
NRBC BLD-RTO: 0 /100 WBCS (ref 0–0)
PLATELET # BLD AUTO: 293 X10*3/UL (ref 150–450)
POTASSIUM SERPL-SCNC: 4.4 MMOL/L (ref 3.5–5.3)
PROT UR-ACNC: 6 MG/DL (ref 5–24)
PROT/CREAT UR: 0.08 MG/MG CREAT (ref 0–0.17)
RBC # BLD AUTO: 3.99 X10*6/UL (ref 4–5.2)
SODIUM SERPL-SCNC: 138 MMOL/L (ref 136–145)
WBC # BLD AUTO: 5.1 X10*3/UL (ref 4.4–11.3)

## 2024-06-14 PROCEDURE — 85025 COMPLETE CBC W/AUTO DIFF WBC: CPT

## 2024-06-14 PROCEDURE — 85652 RBC SED RATE AUTOMATED: CPT

## 2024-06-14 PROCEDURE — 82570 ASSAY OF URINE CREATININE: CPT

## 2024-06-14 PROCEDURE — 86160 COMPLEMENT ANTIGEN: CPT

## 2024-06-14 PROCEDURE — 80048 BASIC METABOLIC PNL TOTAL CA: CPT

## 2024-06-14 PROCEDURE — 86225 DNA ANTIBODY NATIVE: CPT

## 2024-06-14 PROCEDURE — 84156 ASSAY OF PROTEIN URINE: CPT

## 2024-06-14 PROCEDURE — 86140 C-REACTIVE PROTEIN: CPT

## 2024-06-14 PROCEDURE — 36415 COLL VENOUS BLD VENIPUNCTURE: CPT

## 2024-06-23 NOTE — PROGRESS NOTES
Subjective   Patient ID: Mojgan Villalobos is a 63 y.o. female who presents for follow-up regarding SLE and PMR..    HPI 63-year-old female here for  follow-up regarding PMR (onset October 2023 with CRP 1.54) and SLE. She started prednisone 10/15/23.  She initially started prednisone 10 mg 2x daily.  The pain and stiffness in her shoulders and hips resolved within 1 to 2 days of starting prednisone.  Dose was weaned to 5 in AM and 2.5 in PM 3/24.  She continues to deny headaches, jaw claudication or blurred vision.  She has minimal pain in hips and thighs.    She also has  SLE and Sjogren syndrome (SABINO 1:640, positive SSA and SSB antibodies, negative double-stranded DNA). She remains on hydroxychloroquine 300 mg daily, evoxac 30 mg 2 times daily, Systane and GenTeal drops for dry eyes.     She notes trace edema at her ankles late in the day.    She notes that her vasomotor symptoms have worsened since she started prednisone.  She wonders if this will improve with reducing prednisone.    She is trying to exercise on a regular basis.  She tries to walk 30 minutes on treadmill daily.  She also does yoga and interval training.    Cardiac calcium score April 30, 2024 is 37.5.     Cervical spine x-ray done March 2023 shows degenerative disc disease at C5-6, C6-7, C7-T1.  She is working with a chiropractor which has improved her range of motion.     She did not like Biotene toothpaste.  Xylimelt discs did help with dry mouth.  She is working with her ophthalmologist Dr. Waldron regarding dry eyes. She did fail Xiidra. She is back on restasis, which she thinks works well. . She uses Refresh Optiv Jossue-3 drops (no preservative).     Last eye exam was December 23 with Dr. Renner (includes OCT). Had follow up eye exam 6/24.     She gets dental exams every 4 months because of dry mouth.  She needs 2 new crowns due to dental decay.  She also needs a crown replaced on the left upper arch because of caries beneath the crown.  She  "notes that she is meticulous about her dental hygiene.  Her dentist is now also giving her fluoride treatments.     GERD improved with increasing Nexium to 40 mg daily.     She was told on dental exam June 2021 that bones appeared osteopenic.  DEXA done August 2021 shows femoral neck T score -1.0, normal total hip T score, normal lumbar spine T score.    She had Prevnar 20 October 2023.     Labs November 2022: Cholesterol 209, HDL 72, , triglycerides 69, CMP normal, CBC normal except white blood cell count 3.0, TSH 1.93, spot urine protein to creatinine ratio 0.11  Labs June 2024: Spot urine protein creatinine ratio 0.08, ESR 9, CRP 0.25 (less than 1), double-stranded DNA negative, C3 and C4 normal, CBC normal, BMP normal     Cervical spine x-ray 3/23: DJD C5-6, C6-7, C7-T1     DEXA 8/21: T score -1.0 left femoral neck, T score normal total hip, T score normal lumbar spine  DEXA October 2023: T score -1.3 left total hip, T score -1.5 left femoral neck, T score normal lumbar spine.  Estimated 10-year fracture risk by FRAX is 1.6% for hip fracture and 14.4% for major osteoporotic fracture.     EKG 10/22: QTc 470 ms     ROS:  General: Denies fevers or chills.  CV: Denies chest pain or palpitations.  Denies leg edema.  Lungs: Denies coughing or shortness of breath.  Skin: Denies rashes or nodules.  MS: Denies joint pain or joint swelling.       Objective   Visit Vitals  BP (!) 142/92 (BP Location: Left arm, Patient Position: Sitting, BP Cuff Size: Adult)   Pulse 66   Temp 36.4 °C (97.5 °F)   Ht 1.6 m (5' 3\")   Wt 79.3 kg (174 lb 12.8 oz)   SpO2 99%   BMI 30.96 kg/m²   OB Status Postmenopausal   Smoking Status Former   BSA 1.88 m²          Physical Exam  General: Well nourished and well appearing.  HEENT: PERRL, EOMI. Temporal arteries nontender.  Neck: Supple, no nodes.  CV: RRR, no MGR.  Lungs: Clear, no rales or wheezes.  Abdomen: Soft, nontender. No hepatosplenomegaly.  Extremities:  No cyanosis, clubbing, or " edema.   MS: No synovitis.  Skin: No rashes or nodules.  LN: No cervical, supraclavicular, or axillary lymphadenopathy.          Assessment/Plan   Problem List Items Addressed This Visit             ICD-10-CM    Sjogrens syndrome (Multi) M35.00    Systemic lupus erythematosus (Multi) - Primary M32.9    PMR (polymyalgia rheumatica) (Multi) M35.3               1. SLE : Currently stable.     2. Sjogren's syndrome- reasonably stable. Dry eyes have improved with use of Restasis.   Xylimelt helps. Cevimilene 3x daily helps. Dentist is now doing fluoride treatments.   Sees dentist every 4 months.     3.  Osteopenia-currently on prednisone for PMR.  Estimated 10-year fracture risk by FRAX is 1.6% for hip fracture and 14.4% for major osteoporotic fracture.  She started alendronate December 2023.    4. GERD-controlled on Nexium 40 mg daily.     5. Fibromyalgia- She stopped gabapentin.     6. BMI 30- stable. . She will continue to work on weight loss.     7. Cervical spondylosis-advised on gentle range of motion exercises. She can continue with chiropractic care since she finds it beneficial.     8.  PMR-symptoms began in June 2023.  CRP was elevated at 1.5 4 October 2023.  Symptoms resolved within 24 hours of taking prednisone 10 mg twice daily.  Prednisone was reduced to 5 mg in AM and 2.5 mg in PM 3/24.    9. ACP- She has living will, discussed today.    Plan:  Reduce prednisone to 2.5 mg 2x daily.  Check labs 9/24: CBC with diff, BMP, ESR, CRP, spot urine protein to creatinine ratio.  Follow-up in 3 months.

## 2024-06-24 ENCOUNTER — APPOINTMENT (OUTPATIENT)
Dept: RHEUMATOLOGY | Facility: CLINIC | Age: 64
End: 2024-06-24
Payer: MEDICARE

## 2024-06-24 VITALS
HEIGHT: 63 IN | DIASTOLIC BLOOD PRESSURE: 92 MMHG | HEART RATE: 66 BPM | SYSTOLIC BLOOD PRESSURE: 142 MMHG | OXYGEN SATURATION: 99 % | BODY MASS INDEX: 30.97 KG/M2 | TEMPERATURE: 97.5 F | WEIGHT: 174.8 LBS

## 2024-06-24 DIAGNOSIS — M32.9 SYSTEMIC LUPUS ERYTHEMATOSUS, UNSPECIFIED SLE TYPE, UNSPECIFIED ORGAN INVOLVEMENT STATUS (MULTI): Primary | ICD-10-CM

## 2024-06-24 DIAGNOSIS — M35.3 PMR (POLYMYALGIA RHEUMATICA) (MULTI): ICD-10-CM

## 2024-06-24 DIAGNOSIS — M35.00 SJOGREN'S SYNDROME, WITH UNSPECIFIED ORGAN INVOLVEMENT (MULTI): ICD-10-CM

## 2024-06-24 DIAGNOSIS — K21.9 GASTROESOPHAGEAL REFLUX DISEASE WITHOUT ESOPHAGITIS: ICD-10-CM

## 2024-06-24 PROCEDURE — 3080F DIAST BP >= 90 MM HG: CPT | Performed by: INTERNAL MEDICINE

## 2024-06-24 PROCEDURE — 1036F TOBACCO NON-USER: CPT | Performed by: INTERNAL MEDICINE

## 2024-06-24 PROCEDURE — 1123F ACP DISCUSS/DSCN MKR DOCD: CPT | Performed by: INTERNAL MEDICINE

## 2024-06-24 PROCEDURE — 1159F MED LIST DOCD IN RCRD: CPT | Performed by: INTERNAL MEDICINE

## 2024-06-24 PROCEDURE — 3077F SYST BP >= 140 MM HG: CPT | Performed by: INTERNAL MEDICINE

## 2024-06-24 PROCEDURE — 1160F RVW MEDS BY RX/DR IN RCRD: CPT | Performed by: INTERNAL MEDICINE

## 2024-06-24 PROCEDURE — 99214 OFFICE O/P EST MOD 30 MIN: CPT | Performed by: INTERNAL MEDICINE

## 2024-06-24 PROCEDURE — 1158F ADVNC CARE PLAN TLK DOCD: CPT | Performed by: INTERNAL MEDICINE

## 2024-06-24 PROCEDURE — 3008F BODY MASS INDEX DOCD: CPT | Performed by: INTERNAL MEDICINE

## 2024-06-24 RX ORDER — ESOMEPRAZOLE MAGNESIUM 40 MG/1
40 CAPSULE, DELAYED RELEASE ORAL
Qty: 90 CAPSULE | Refills: 3 | Status: SHIPPED | OUTPATIENT
Start: 2024-06-24

## 2024-06-24 ASSESSMENT — PATIENT HEALTH QUESTIONNAIRE - PHQ9
SUM OF ALL RESPONSES TO PHQ9 QUESTIONS 1 AND 2: 0
2. FEELING DOWN, DEPRESSED OR HOPELESS: NOT AT ALL
1. LITTLE INTEREST OR PLEASURE IN DOING THINGS: NOT AT ALL

## 2024-06-24 NOTE — PATIENT INSTRUCTIONS
Reduce prednisone to 2.5 mg 2x daily.  Check labs 9/24: CBC with diff, BMP, ESR, CRP, spot urine protein to creatinine ratio.  Follow-up in 3 months.

## 2024-07-23 DIAGNOSIS — F51.01 PRIMARY INSOMNIA: ICD-10-CM

## 2024-07-23 DIAGNOSIS — I10 ESSENTIAL HYPERTENSION: ICD-10-CM

## 2024-07-23 RX ORDER — AMLODIPINE BESYLATE 5 MG/1
5 TABLET ORAL DAILY
Qty: 90 TABLET | Refills: 1 | Status: SHIPPED | OUTPATIENT
Start: 2024-07-23

## 2024-07-23 RX ORDER — LOSARTAN POTASSIUM 100 MG/1
100 TABLET ORAL DAILY
Qty: 90 TABLET | Refills: 1 | Status: SHIPPED | OUTPATIENT
Start: 2024-07-23

## 2024-07-23 RX ORDER — ZOLPIDEM TARTRATE 10 MG/1
10 TABLET ORAL DAILY
Qty: 90 TABLET | Refills: 0 | Status: SHIPPED | OUTPATIENT
Start: 2024-07-23 | End: 2024-07-25 | Stop reason: SDUPTHER

## 2024-07-25 DIAGNOSIS — F51.01 PRIMARY INSOMNIA: ICD-10-CM

## 2024-07-25 RX ORDER — ZOLPIDEM TARTRATE 10 MG/1
10 TABLET ORAL DAILY
Qty: 90 TABLET | Refills: 0 | Status: SHIPPED | OUTPATIENT
Start: 2024-07-25 | End: 2024-10-23

## 2024-07-29 DIAGNOSIS — F51.01 PRIMARY INSOMNIA: ICD-10-CM

## 2024-07-29 RX ORDER — ZOLPIDEM TARTRATE 10 MG/1
10 TABLET ORAL DAILY
Qty: 90 TABLET | Refills: 0 | Status: SHIPPED | OUTPATIENT
Start: 2024-07-29 | End: 2024-10-27

## 2024-08-05 DIAGNOSIS — M35.3 PMR (POLYMYALGIA RHEUMATICA) (MULTI): Primary | ICD-10-CM

## 2024-08-05 RX ORDER — PREDNISONE 1 MG/1
TABLET ORAL
Qty: 30 TABLET | Refills: 5 | Status: SHIPPED | OUTPATIENT
Start: 2024-08-05

## 2024-09-17 ENCOUNTER — LAB (OUTPATIENT)
Dept: LAB | Facility: LAB | Age: 64
End: 2024-09-17
Payer: MEDICARE

## 2024-09-17 DIAGNOSIS — Z13.6 SCREENING FOR CARDIOVASCULAR CONDITION: ICD-10-CM

## 2024-09-17 DIAGNOSIS — M79.7 FIBROMYALGIA: ICD-10-CM

## 2024-09-17 DIAGNOSIS — M35.3 PMR (POLYMYALGIA RHEUMATICA) (MULTI): ICD-10-CM

## 2024-09-17 DIAGNOSIS — M32.9 SYSTEMIC LUPUS ERYTHEMATOSUS, UNSPECIFIED SLE TYPE, UNSPECIFIED ORGAN INVOLVEMENT STATUS (MULTI): ICD-10-CM

## 2024-09-17 LAB
ANION GAP SERPL CALC-SCNC: 11 MMOL/L (ref 10–20)
BASOPHILS # BLD AUTO: 0.08 X10*3/UL (ref 0–0.1)
BASOPHILS NFR BLD AUTO: 1.5 %
BUN SERPL-MCNC: 16 MG/DL (ref 6–23)
CALCIUM SERPL-MCNC: 9.6 MG/DL (ref 8.6–10.3)
CHLORIDE SERPL-SCNC: 104 MMOL/L (ref 98–107)
CHOLEST SERPL-MCNC: 217 MG/DL (ref 0–199)
CHOLESTEROL/HDL RATIO: 2.6
CO2 SERPL-SCNC: 29 MMOL/L (ref 21–32)
CREAT SERPL-MCNC: 0.91 MG/DL (ref 0.5–1.05)
CREAT UR-MCNC: 66.5 MG/DL (ref 20–320)
CRP SERPL-MCNC: 0.52 MG/DL
EGFRCR SERPLBLD CKD-EPI 2021: 70 ML/MIN/1.73M*2
EOSINOPHIL # BLD AUTO: 0.19 X10*3/UL (ref 0–0.7)
EOSINOPHIL NFR BLD AUTO: 3.5 %
ERYTHROCYTE [DISTWIDTH] IN BLOOD BY AUTOMATED COUNT: 14.1 % (ref 11.5–14.5)
ERYTHROCYTE [SEDIMENTATION RATE] IN BLOOD BY WESTERGREN METHOD: 10 MM/H (ref 0–30)
GLUCOSE SERPL-MCNC: 74 MG/DL (ref 74–99)
HCT VFR BLD AUTO: 38 % (ref 36–46)
HDLC SERPL-MCNC: 83.8 MG/DL
HGB BLD-MCNC: 12.4 G/DL (ref 12–16)
IMM GRANULOCYTES # BLD AUTO: 0.01 X10*3/UL (ref 0–0.7)
IMM GRANULOCYTES NFR BLD AUTO: 0.2 % (ref 0–0.9)
LDLC SERPL CALC-MCNC: 110 MG/DL
LYMPHOCYTES # BLD AUTO: 1.98 X10*3/UL (ref 1.2–4.8)
LYMPHOCYTES NFR BLD AUTO: 36.7 %
MCH RBC QN AUTO: 29.9 PG (ref 26–34)
MCHC RBC AUTO-ENTMCNC: 32.6 G/DL (ref 32–36)
MCV RBC AUTO: 92 FL (ref 80–100)
MONOCYTES # BLD AUTO: 0.54 X10*3/UL (ref 0.1–1)
MONOCYTES NFR BLD AUTO: 10 %
NEUTROPHILS # BLD AUTO: 2.59 X10*3/UL (ref 1.2–7.7)
NEUTROPHILS NFR BLD AUTO: 48.1 %
NON HDL CHOLESTEROL: 133 MG/DL (ref 0–149)
NRBC BLD-RTO: 0 /100 WBCS (ref 0–0)
PLATELET # BLD AUTO: 308 X10*3/UL (ref 150–450)
POTASSIUM SERPL-SCNC: 4.3 MMOL/L (ref 3.5–5.3)
PROT UR-ACNC: 5 MG/DL (ref 5–24)
PROT/CREAT UR: 0.08 MG/MG CREAT (ref 0–0.17)
RBC # BLD AUTO: 4.15 X10*6/UL (ref 4–5.2)
SODIUM SERPL-SCNC: 140 MMOL/L (ref 136–145)
TRIGL SERPL-MCNC: 117 MG/DL (ref 0–149)
TSH SERPL-ACNC: 3.03 MIU/L (ref 0.44–3.98)
VLDL: 23 MG/DL (ref 0–40)
WBC # BLD AUTO: 5.4 X10*3/UL (ref 4.4–11.3)

## 2024-09-17 PROCEDURE — 84443 ASSAY THYROID STIM HORMONE: CPT

## 2024-09-17 PROCEDURE — 86140 C-REACTIVE PROTEIN: CPT

## 2024-09-17 PROCEDURE — 84156 ASSAY OF PROTEIN URINE: CPT

## 2024-09-17 PROCEDURE — 36415 COLL VENOUS BLD VENIPUNCTURE: CPT

## 2024-09-17 PROCEDURE — 80048 BASIC METABOLIC PNL TOTAL CA: CPT

## 2024-09-17 PROCEDURE — 85652 RBC SED RATE AUTOMATED: CPT

## 2024-09-17 PROCEDURE — 80061 LIPID PANEL: CPT

## 2024-09-17 PROCEDURE — 85025 COMPLETE CBC W/AUTO DIFF WBC: CPT

## 2024-09-17 PROCEDURE — 82570 ASSAY OF URINE CREATININE: CPT

## 2024-09-17 RX ORDER — PREDNISONE 5 MG/1
TABLET ORAL
Qty: 30 TABLET | Refills: 3 | Status: SHIPPED | OUTPATIENT
Start: 2024-09-17

## 2024-09-23 ENCOUNTER — APPOINTMENT (OUTPATIENT)
Dept: RHEUMATOLOGY | Facility: CLINIC | Age: 64
End: 2024-09-23
Payer: MEDICARE

## 2024-09-23 VITALS
TEMPERATURE: 98.4 F | SYSTOLIC BLOOD PRESSURE: 144 MMHG | OXYGEN SATURATION: 99 % | RESPIRATION RATE: 16 BRPM | HEART RATE: 68 BPM | WEIGHT: 176.4 LBS | BODY MASS INDEX: 31.25 KG/M2 | HEIGHT: 63 IN | DIASTOLIC BLOOD PRESSURE: 90 MMHG

## 2024-09-23 DIAGNOSIS — M32.9 SYSTEMIC LUPUS ERYTHEMATOSUS, UNSPECIFIED SLE TYPE, UNSPECIFIED ORGAN INVOLVEMENT STATUS (MULTI): Primary | ICD-10-CM

## 2024-09-23 DIAGNOSIS — Z00.00 HEALTHCARE MAINTENANCE: ICD-10-CM

## 2024-09-23 DIAGNOSIS — M35.3 PMR (POLYMYALGIA RHEUMATICA) (MULTI): ICD-10-CM

## 2024-09-23 PROCEDURE — 90662 IIV NO PRSV INCREASED AG IM: CPT | Performed by: INTERNAL MEDICINE

## 2024-09-23 PROCEDURE — 1159F MED LIST DOCD IN RCRD: CPT | Performed by: INTERNAL MEDICINE

## 2024-09-23 PROCEDURE — 3077F SYST BP >= 140 MM HG: CPT | Performed by: INTERNAL MEDICINE

## 2024-09-23 PROCEDURE — 3079F DIAST BP 80-89 MM HG: CPT | Performed by: INTERNAL MEDICINE

## 2024-09-23 PROCEDURE — 1036F TOBACCO NON-USER: CPT | Performed by: INTERNAL MEDICINE

## 2024-09-23 PROCEDURE — 1160F RVW MEDS BY RX/DR IN RCRD: CPT | Performed by: INTERNAL MEDICINE

## 2024-09-23 PROCEDURE — G0008 ADMIN INFLUENZA VIRUS VAC: HCPCS | Performed by: INTERNAL MEDICINE

## 2024-09-23 PROCEDURE — 3008F BODY MASS INDEX DOCD: CPT | Performed by: INTERNAL MEDICINE

## 2024-09-23 PROCEDURE — 99214 OFFICE O/P EST MOD 30 MIN: CPT | Performed by: INTERNAL MEDICINE

## 2024-09-23 ASSESSMENT — PATIENT HEALTH QUESTIONNAIRE - PHQ9
1. LITTLE INTEREST OR PLEASURE IN DOING THINGS: NOT AT ALL
2. FEELING DOWN, DEPRESSED OR HOPELESS: NOT AT ALL
SUM OF ALL RESPONSES TO PHQ9 QUESTIONS 1 AND 2: 0

## 2024-09-23 NOTE — PATIENT INSTRUCTIONS
High dose flu vaccine was given today.  Check blood pressures at home and let us know how they are running.  If blood pressure continues to be elevated, you may require the addition of a diuretic.  Continue prednisone 6 mg daily.  Check labs before next appointment 12/24: CBC with diff, BMP, dsDNA, C3, C4 , ESR, CRP, spot urine protein to creatinine ratio.  Follow-up in 3 months.

## 2024-09-23 NOTE — PROGRESS NOTES
Subjective   Patient ID: Mojgan Villalobos is a 63 y.o. female who presents for follow-up regarding SLE and PMR..    HPI 63-year-old female here for  follow-up regarding PMR (onset October 2023 with CRP 1.54) and SLE. She started prednisone 10/15/23.  She initially started prednisone 10 mg 2x daily.  The pain and stiffness in her shoulders and hips resolved within 1 to 2 days of starting prednisone.  Dose was weaned to 5 in AM and 2.5 in PM 3/24.  She did not tolerate tapering prednisone to 5 mg daily June 2024, went back to 6 mg daily.    She c/o increased aching in shoulders, hips, thighs, and hands over the last week.  She continues to deny headaches, jaw claudication or blurred vision.  She was working more over the last month, but this will subside again.    She also has  SLE and Sjogren syndrome (SABINO 1:640, positive SSA and SSB antibodies, negative double-stranded DNA). She remains on hydroxychloroquine 300 mg daily, evoxac 30 mg 2 times daily, Systane , GenTeal drops, and  Restasis for dry eyes.    She notes trace edema at her ankles late in the day.    She notes that her vasomotor symptoms have worsened since she started prednisone.  She wonders if this will improve with reducing prednisone.    She is trying to exercise on a regular basis.  She tries to walk 30 minutes on treadmill daily.  She also does yoga and interval training.    Cardiac calcium score April 30, 2024 is 37.5.     Cervical spine x-ray done March 2023 shows degenerative disc disease at C5-6, C6-7, C7-T1.  She is working with a chiropractor which has improved her range of motion.     She did not like Biotene toothpaste.  Xylimelt discs did help with dry mouth.  She is working with her ophthalmologist Dr. Waldron regarding dry eyes. She did fail Xiidra. She is back on restasis, which she thinks works well. . She uses Refresh Optiv Jossue-3 drops (no preservative).     Last eye exam was December 23 with Dr. Renner (includes OCT). Had follow up eye  "exam 5/24 with Rosalee Waldron.     She gets dental exams every 4 months because of dry mouth.  She needs 2 new crowns due to dental decay.  She also needs a crown replaced on the left upper arch because of caries beneath the crown.  She notes that she is meticulous about her dental hygiene.  Her dentist is now also giving her fluoride treatments.     GERD improved with increasing Nexium to 40 mg daily.     She was told on dental exam June 2021 that bones appeared osteopenic.  DEXA done August 2021 shows femoral neck T score -1.0, normal total hip T score, normal lumbar spine T score.    She had Prevnar 20 October 2023.     Labs September 2024: CBC normal, BMP normal, ESR 10, CRP 0.52 (normal less than 1), TSH 3.03, cholesterol 217, HDL 83, , triglycerides 117, spot urine protein to creatinine ratio 0.08     Cervical spine x-ray 3/23: DJD C5-6, C6-7, C7-T1     DEXA 8/21: T score -1.0 left femoral neck, T score normal total hip, T score normal lumbar spine  DEXA October 2023: T score -1.3 left total hip, T score -1.5 left femoral neck, T score normal lumbar spine.  Estimated 10-year fracture risk by FRAX is 1.6% for hip fracture and 14.4% for major osteoporotic fracture.     EKG 10/22: QTc 470 ms    Last eye exam was 5/24 (Dr. Waldron). She will see Dr. Renner in December.     ROS:  General: Denies fevers or chills.  CV: Denies chest pain or palpitations.  Denies leg edema.  Lungs: Denies coughing or shortness of breath.  Skin: Denies rashes or nodules.  MS: c/o aching in shoulders , hips, hands.      Objective   Visit Vitals  /87 (BP Location: Left arm, Patient Position: Sitting, BP Cuff Size: Large adult)   Pulse 68   Temp 36.9 °C (98.4 °F) (Skin)   Resp 16   Ht 1.6 m (5' 3\")   Wt 80 kg (176 lb 6.4 oz)   SpO2 99%   BMI 31.25 kg/m²   OB Status Postmenopausal   Smoking Status Former   BSA 1.89 m²          Physical Exam  General: Well nourished and well appearing.  HEENT: PERRL, EOMI. Temporal arteries " nontender.  Neck: Supple, no nodes.  CV: RRR, no MGR.  Lungs: Clear, no rales or wheezes.  Abdomen: Soft, nontender. No hepatosplenomegaly.  Extremities:  No cyanosis, clubbing, or edema.   MS: No synovitis.  Skin: No rashes or nodules.  LN: No cervical, supraclavicular, or axillary lymphadenopathy.          Assessment/Plan   Problem List Items Addressed This Visit             ICD-10-CM    Systemic lupus erythematosus (Multi) - Primary M32.9    PMR (polymyalgia rheumatica) (Multi) M35.3         1. SLE : Currently stable.     2. Sjogren's syndrome- reasonably stable. Dry eyes have improved with use of Restasis.   Xylimelt helps. Cevimilene 3x daily helps. Dentist is now doing fluoride treatments.   Sees dentist every 4 months.     3.  Osteopenia-currently on prednisone for PMR.  Estimated 10-year fracture risk by FRAX is 1.6% for hip fracture and 14.4% for major osteoporotic fracture.  She started alendronate December 2023.    4. GERD-controlled on Nexium 40 mg daily.     5. Fibromyalgia- She stopped gabapentin.     6. BMI 31- stable. . She will continue to work on weight loss.     7. Cervical spondylosis-advised on gentle range of motion exercises. She can continue with chiropractic care since she finds it beneficial.     8.  PMR-symptoms began in June 2023.  CRP was elevated at 1.5 4 October 2023.  Symptoms resolved within 24 hours of taking prednisone 10 mg twice daily.  Prednisone was reduced to 6 mg in PM 3/24. She did not tolerate taper to 5 mg daily 6/24.  She has been more achy over the last week, but ESR and CRP remain normal.    9. ACP- She has living will, discussed 6/24..    Plan:  High dose flu vaccine was given today.  Check blood pressures at home and let us know how they are running.  If blood pressure continues to be elevated, you may require the addition of a diuretic.  Continue prednisone 6 mg daily.  Check labs before next appointment 12/24: CBC with diff, BMP, dsDNA, C3, C4 , ESR, CRP, spot urine  protein to creatinine ratio.  Follow-up in 3 months.

## 2024-10-15 ENCOUNTER — HOSPITAL ENCOUNTER (OUTPATIENT)
Dept: RADIOLOGY | Facility: CLINIC | Age: 64
Discharge: HOME | End: 2024-10-15
Payer: MEDICARE

## 2024-10-15 VITALS — BODY MASS INDEX: 30.83 KG/M2 | WEIGHT: 174 LBS | HEIGHT: 63 IN

## 2024-10-15 DIAGNOSIS — Z12.31 ENCOUNTER FOR SCREENING MAMMOGRAM FOR MALIGNANT NEOPLASM OF BREAST: ICD-10-CM

## 2024-10-15 PROCEDURE — 77067 SCR MAMMO BI INCL CAD: CPT | Performed by: RADIOLOGY

## 2024-10-15 PROCEDURE — 77063 BREAST TOMOSYNTHESIS BI: CPT | Performed by: RADIOLOGY

## 2024-10-15 PROCEDURE — 77067 SCR MAMMO BI INCL CAD: CPT

## 2024-10-21 ENCOUNTER — APPOINTMENT (OUTPATIENT)
Dept: PRIMARY CARE | Facility: CLINIC | Age: 64
End: 2024-10-21
Payer: MEDICARE

## 2024-10-31 ENCOUNTER — APPOINTMENT (OUTPATIENT)
Dept: PRIMARY CARE | Facility: CLINIC | Age: 64
End: 2024-10-31
Payer: MEDICARE

## 2024-12-16 ENCOUNTER — LAB (OUTPATIENT)
Dept: LAB | Facility: LAB | Age: 64
End: 2024-12-16
Payer: MEDICARE

## 2024-12-16 DIAGNOSIS — M35.3 PMR (POLYMYALGIA RHEUMATICA) (MULTI): ICD-10-CM

## 2024-12-16 DIAGNOSIS — M32.9 SYSTEMIC LUPUS ERYTHEMATOSUS, UNSPECIFIED SLE TYPE, UNSPECIFIED ORGAN INVOLVEMENT STATUS (MULTI): ICD-10-CM

## 2024-12-16 LAB
ANION GAP SERPL CALC-SCNC: 11 MMOL/L (ref 10–20)
BASOPHILS # BLD AUTO: 0.05 X10*3/UL (ref 0–0.1)
BASOPHILS NFR BLD AUTO: 0.7 %
BUN SERPL-MCNC: 20 MG/DL (ref 6–23)
C3 SERPL-MCNC: 141 MG/DL (ref 87–200)
C4 SERPL-MCNC: 34 MG/DL (ref 10–50)
CALCIUM SERPL-MCNC: 9.6 MG/DL (ref 8.6–10.3)
CHLORIDE SERPL-SCNC: 106 MMOL/L (ref 98–107)
CO2 SERPL-SCNC: 28 MMOL/L (ref 21–32)
CREAT SERPL-MCNC: 0.87 MG/DL (ref 0.5–1.05)
CREAT UR-MCNC: 90.1 MG/DL (ref 20–320)
CRP SERPL-MCNC: 0.53 MG/DL
DSDNA AB SER-ACNC: 1 IU/ML
EGFRCR SERPLBLD CKD-EPI 2021: 74 ML/MIN/1.73M*2
EOSINOPHIL # BLD AUTO: 0.09 X10*3/UL (ref 0–0.7)
EOSINOPHIL NFR BLD AUTO: 1.2 %
ERYTHROCYTE [DISTWIDTH] IN BLOOD BY AUTOMATED COUNT: 14.6 % (ref 11.5–14.5)
ERYTHROCYTE [SEDIMENTATION RATE] IN BLOOD BY WESTERGREN METHOD: 9 MM/H (ref 0–30)
GLUCOSE SERPL-MCNC: 92 MG/DL (ref 74–99)
HCT VFR BLD AUTO: 36.3 % (ref 36–46)
HGB BLD-MCNC: 11.9 G/DL (ref 12–16)
IMM GRANULOCYTES # BLD AUTO: 0.03 X10*3/UL (ref 0–0.7)
IMM GRANULOCYTES NFR BLD AUTO: 0.4 % (ref 0–0.9)
LYMPHOCYTES # BLD AUTO: 1 X10*3/UL (ref 1.2–4.8)
LYMPHOCYTES NFR BLD AUTO: 13.3 %
MCH RBC QN AUTO: 30.2 PG (ref 26–34)
MCHC RBC AUTO-ENTMCNC: 32.8 G/DL (ref 32–36)
MCV RBC AUTO: 92 FL (ref 80–100)
MONOCYTES # BLD AUTO: 0.5 X10*3/UL (ref 0.1–1)
MONOCYTES NFR BLD AUTO: 6.6 %
NEUTROPHILS # BLD AUTO: 5.86 X10*3/UL (ref 1.2–7.7)
NEUTROPHILS NFR BLD AUTO: 77.8 %
NRBC BLD-RTO: 0 /100 WBCS (ref 0–0)
PLATELET # BLD AUTO: 289 X10*3/UL (ref 150–450)
POTASSIUM SERPL-SCNC: 4.4 MMOL/L (ref 3.5–5.3)
PROT UR-ACNC: 11 MG/DL (ref 5–24)
PROT/CREAT UR: 0.12 MG/MG CREAT (ref 0–0.17)
RBC # BLD AUTO: 3.94 X10*6/UL (ref 4–5.2)
SODIUM SERPL-SCNC: 141 MMOL/L (ref 136–145)
WBC # BLD AUTO: 7.5 X10*3/UL (ref 4.4–11.3)

## 2024-12-16 PROCEDURE — 86225 DNA ANTIBODY NATIVE: CPT

## 2024-12-16 PROCEDURE — 84156 ASSAY OF PROTEIN URINE: CPT

## 2024-12-16 PROCEDURE — 85652 RBC SED RATE AUTOMATED: CPT

## 2024-12-16 PROCEDURE — 36415 COLL VENOUS BLD VENIPUNCTURE: CPT

## 2024-12-16 PROCEDURE — 86160 COMPLEMENT ANTIGEN: CPT

## 2024-12-16 PROCEDURE — 80048 BASIC METABOLIC PNL TOTAL CA: CPT

## 2024-12-16 PROCEDURE — 85025 COMPLETE CBC W/AUTO DIFF WBC: CPT

## 2024-12-16 PROCEDURE — 82570 ASSAY OF URINE CREATININE: CPT

## 2024-12-16 PROCEDURE — 86140 C-REACTIVE PROTEIN: CPT

## 2024-12-23 ENCOUNTER — APPOINTMENT (OUTPATIENT)
Dept: RHEUMATOLOGY | Facility: CLINIC | Age: 64
End: 2024-12-23
Payer: MEDICARE

## 2024-12-23 VITALS
DIASTOLIC BLOOD PRESSURE: 90 MMHG | SYSTOLIC BLOOD PRESSURE: 154 MMHG | WEIGHT: 179 LBS | OXYGEN SATURATION: 97 % | RESPIRATION RATE: 16 BRPM | TEMPERATURE: 98.8 F | HEART RATE: 66 BPM | BODY MASS INDEX: 31.71 KG/M2 | HEIGHT: 63 IN

## 2024-12-23 DIAGNOSIS — M35.00 SJOGREN'S SYNDROME, WITH UNSPECIFIED ORGAN INVOLVEMENT (MULTI): ICD-10-CM

## 2024-12-23 DIAGNOSIS — M35.3 PMR (POLYMYALGIA RHEUMATICA) (MULTI): ICD-10-CM

## 2024-12-23 DIAGNOSIS — M32.9 SYSTEMIC LUPUS ERYTHEMATOSUS, UNSPECIFIED SLE TYPE, UNSPECIFIED ORGAN INVOLVEMENT STATUS (MULTI): Primary | ICD-10-CM

## 2024-12-23 PROCEDURE — 3008F BODY MASS INDEX DOCD: CPT | Performed by: INTERNAL MEDICINE

## 2024-12-23 PROCEDURE — 3079F DIAST BP 80-89 MM HG: CPT | Performed by: INTERNAL MEDICINE

## 2024-12-23 PROCEDURE — 1159F MED LIST DOCD IN RCRD: CPT | Performed by: INTERNAL MEDICINE

## 2024-12-23 PROCEDURE — 1160F RVW MEDS BY RX/DR IN RCRD: CPT | Performed by: INTERNAL MEDICINE

## 2024-12-23 PROCEDURE — 1125F AMNT PAIN NOTED PAIN PRSNT: CPT | Performed by: INTERNAL MEDICINE

## 2024-12-23 PROCEDURE — 99214 OFFICE O/P EST MOD 30 MIN: CPT | Performed by: INTERNAL MEDICINE

## 2024-12-23 PROCEDURE — 1036F TOBACCO NON-USER: CPT | Performed by: INTERNAL MEDICINE

## 2024-12-23 PROCEDURE — 3077F SYST BP >= 140 MM HG: CPT | Performed by: INTERNAL MEDICINE

## 2024-12-23 ASSESSMENT — PAIN SCALES - GENERAL: PAINLEVEL_OUTOF10: 2

## 2024-12-23 NOTE — PATIENT INSTRUCTIONS
Continue Prednisone 6 mg daily.  Check labs 3/25: CBC with diff, BMP, ESR, spot urine protein to creatinine ratio.  Follow-up in 3 months.

## 2024-12-23 NOTE — PROGRESS NOTES
Subjective   Patient ID: Mojgan Villalobos is a 64 y.o. female who presents for follow-up regarding SLE and PMR..    HPI 64-year-old female here for  follow-up regarding PMR (onset October 2023 with CRP 1.54)  SLE, and Sjogren's syndrome.   She has h/o SABINO 1:640, SSA+, SSB+.    She started prednisone 10/15/23.  She initially started prednisone 10 mg 2x daily.  The pain and stiffness in her shoulders and hips resolved within 1 to 2 days of starting prednisone.  Dose was weaned to 5 in AM and 2.5 in PM 3/24.    She did not tolerate tapering prednisone to 5 mg daily June 2024, went back to 6 mg daily.  She tried tapering prednisone to 5 mg again November 11, 2024.  She again did not tolerate it, with recurrence of her PMR symptoms (she tried for 3 weeks).  She is now back on prednisone 6 mg daily.  She has mild residual symptoms but notes that it is tolerable.    She continues to deny headaches, jaw claudication or blurred vision.    She has been checking her blood pressures at home periodically.  She notes that her systolic blood pressures usually in the 120s.    She also exercises twice weekly, doing water aerobics, step class or weights.    She also has  SLE and Sjogren syndrome (SABINO 1:640, positive SSA and SSB antibodies, negative double-stranded DNA).     She remains on hydroxychloroquine 300 mg daily, evoxac 30 mg 2 times daily, Systane , GenTeal drops, and  Restasis for dry eyes.    She notes trace edema at her ankles late in the day.    She notes that her vasomotor symptoms have worsened since she started prednisone.  She wonders if this will improve with reducing prednisone.    Cardiac calcium score April 30, 2024 is 37.5.     Cervical spine x-ray done March 2023 shows degenerative disc disease at C5-6, C6-7, C7-T1.  She is working with a chiropractor which has improved her range of motion.     She did not like Biotene toothpaste.  Xylimelt discs did help with dry mouth.  She is working with her  ophthalmologist Dr. Waldron regarding dry eyes. She did fail Xiidra. She is back on restasis, which she thinks works well. . She uses Refresh Optiv Jossue-3 drops (no preservative).     Had follow up eye exam 5/24 with Rosalee Waldron.  Had follow-up eye exam December 2024 with Dr. Renner (retina specialist), which also included OCT.     She gets dental exams every 4 months because of dry mouth.  She needs 2 new crowns due to dental decay.  She also needs a crown replaced on the left upper arch because of caries beneath the crown.  She notes that she is meticulous about her dental hygiene.  Her dentist is now also giving her fluoride treatments.     GERD improved with increasing Nexium to 40 mg daily.     She was told on dental exam June 2021 that bones appeared osteopenic.  DEXA done August 2021 shows femoral neck T score -1.0, normal total hip T score, normal lumbar spine T score.    She had Prevnar 20 October 2023.  She had flu shot 9/24.  She had Pfizer COVID vaccine 11/24.     Labs September 2024: CBC normal, BMP normal, ESR 10, CRP 0.52 (normal less than 1), TSH 3.03, cholesterol 217, HDL 83, , triglycerides 117, spot urine protein to creatinine ratio 0.08  Labs December 2024: CBC normal except hemoglobin 11.9, BMP normal, double-stranded DNA negative, C3 and C4 normal, spot urine protein to creatinine ratio 0.12, ESR 9, CRP 0.53 (less than 1)     Cervical spine x-ray 3/23: DJD C5-6, C6-7, C7-T1     DEXA 8/21: T score -1.0 left femoral neck, T score normal total hip, T score normal lumbar spine  DEXA October 2023: T score -1.3 left total hip, T score -1.5 left femoral neck, T score normal lumbar spine.  Estimated 10-year fracture risk by FRAX is 1.6% for hip fracture and 14.4% for major osteoporotic fracture.     EKG 10/22: QTc 470 ms    ROS:  General: Denies fevers or chills.  CV: Denies chest pain or palpitations.  Denies leg edema.  Lungs: Denies coughing or shortness of breath.  Skin: Denies rashes or  "nodules.  MS: c/o some aching in shoulders , hips, hands.      Objective   Visit Vitals  /86 (BP Location: Left arm, Patient Position: Sitting, BP Cuff Size: Adult)   Pulse 66   Temp 37.1 °C (98.8 °F) (Skin)   Resp 16   Ht 1.6 m (5' 3\")   Wt 81.2 kg (179 lb)   SpO2 97%   BMI 31.71 kg/m²   OB Status Postmenopausal   Smoking Status Former   BSA 1.9 m²   Visit Vitals  /90 (BP Location: Left arm, Patient Position: Sitting, BP Cuff Size: Adult)   Pulse 66   Temp 37.1 °C (98.8 °F) (Skin)   Resp 16   Ht 1.6 m (5' 3\")   Wt 81.2 kg (179 lb)   SpO2 97%   BMI 31.71 kg/m²   OB Status Postmenopausal   Smoking Status Former   BSA 1.9 m²         Physical Exam  General: Well nourished and well appearing.  HEENT: PERRL, EOMI. Temporal arteries nontender.  Neck: Supple, no nodes.  CV: RRR, no MGR.  Lungs: Clear, no rales or wheezes.  Abdomen: Soft, nontender. No hepatosplenomegaly.  Extremities:  No cyanosis, clubbing, or edema.   MS: No synovitis.  Skin: No rashes or nodules.  LN: No cervical, supraclavicular, or axillary lymphadenopathy.          Assessment/Plan   Problem List Items Addressed This Visit             ICD-10-CM    Sjogrens syndrome M35.00    Systemic lupus erythematosus (Multi) - Primary M32.9    PMR (polymyalgia rheumatica) (Multi) M35.3             1. SLE : Currently stable.     2. Sjogren's syndrome- reasonably stable. Dry eyes have improved with use of Restasis.   Xylimelt helps. Cevimilene 3x daily helps. Dentist is now doing fluoride treatments.   Sees dentist every 4 months.     3.  Osteopenia-currently on prednisone for PMR.  Estimated 10-year fracture risk by FRAX is 1.6% for hip fracture and 14.4% for major osteoporotic fracture.  She started alendronate December 2023.  Next DEXA will be due 10/25.    4. GERD-controlled on Nexium 40 mg daily.     5. Fibromyalgia- She stopped gabapentin.     6. BMI 31- stable. . She will continue to work on weight loss.     7. Cervical spondylosis-advised on " gentle range of motion exercises. She can continue with chiropractic care since she finds it beneficial.     8.  PMR-symptoms began in June 2023.  CRP was elevated at 1.5 4 October 2023.  Symptoms resolved within 24 hours of taking prednisone 10 mg twice daily.  Prednisone was reduced to 6 mg in PM 3/24. She did not tolerate taper to 5 mg daily 6/24 or 11/24.      9. ACP- She has living will, discussed 6/24..    Plan:  Continue Prednisone 6 mg daily.  Check labs 3/25: CBC with diff, BMP, ESR, spot urine protein to creatinine ratio.  Follow-up in 3 months.

## 2025-01-05 DIAGNOSIS — M85.80 OSTEOPENIA, UNSPECIFIED LOCATION: ICD-10-CM

## 2025-01-06 RX ORDER — ALENDRONATE SODIUM 70 MG/1
TABLET ORAL
Qty: 12 TABLET | Refills: 3 | Status: SHIPPED | OUTPATIENT
Start: 2025-01-06

## 2025-01-23 ENCOUNTER — APPOINTMENT (OUTPATIENT)
Dept: PRIMARY CARE | Facility: CLINIC | Age: 65
End: 2025-01-23
Payer: MEDICARE

## 2025-01-23 ENCOUNTER — HOSPITAL ENCOUNTER (OUTPATIENT)
Dept: RADIOLOGY | Facility: CLINIC | Age: 65
Discharge: HOME | End: 2025-01-23
Payer: MEDICARE

## 2025-01-23 VITALS
DIASTOLIC BLOOD PRESSURE: 84 MMHG | HEART RATE: 76 BPM | SYSTOLIC BLOOD PRESSURE: 166 MMHG | OXYGEN SATURATION: 99 % | WEIGHT: 179.6 LBS | TEMPERATURE: 97.2 F | BODY MASS INDEX: 31.82 KG/M2 | HEIGHT: 63 IN

## 2025-01-23 DIAGNOSIS — E66.09 CLASS 1 OBESITY DUE TO EXCESS CALORIES WITH SERIOUS COMORBIDITY AND BODY MASS INDEX (BMI) OF 32.0 TO 32.9 IN ADULT: ICD-10-CM

## 2025-01-23 DIAGNOSIS — Z00.00 ROUTINE GENERAL MEDICAL EXAMINATION AT HEALTH CARE FACILITY: Primary | ICD-10-CM

## 2025-01-23 DIAGNOSIS — G89.29 CHRONIC PAIN OF LEFT KNEE: ICD-10-CM

## 2025-01-23 DIAGNOSIS — M32.9 SYSTEMIC LUPUS ERYTHEMATOSUS, UNSPECIFIED SLE TYPE, UNSPECIFIED ORGAN INVOLVEMENT STATUS (MULTI): ICD-10-CM

## 2025-01-23 DIAGNOSIS — M25.562 CHRONIC PAIN OF LEFT KNEE: ICD-10-CM

## 2025-01-23 DIAGNOSIS — Z00.00 ROUTINE GENERAL MEDICAL EXAMINATION AT A HEALTH CARE FACILITY: ICD-10-CM

## 2025-01-23 DIAGNOSIS — Z12.11 SCREENING FOR COLORECTAL CANCER: ICD-10-CM

## 2025-01-23 DIAGNOSIS — Z12.12 SCREENING FOR COLORECTAL CANCER: ICD-10-CM

## 2025-01-23 DIAGNOSIS — M35.3 PMR (POLYMYALGIA RHEUMATICA) (MULTI): ICD-10-CM

## 2025-01-23 DIAGNOSIS — I10 ESSENTIAL HYPERTENSION: ICD-10-CM

## 2025-01-23 DIAGNOSIS — E66.811 CLASS 1 OBESITY DUE TO EXCESS CALORIES WITH SERIOUS COMORBIDITY AND BODY MASS INDEX (BMI) OF 32.0 TO 32.9 IN ADULT: ICD-10-CM

## 2025-01-23 DIAGNOSIS — R13.10 DYSPHAGIA, UNSPECIFIED TYPE: ICD-10-CM

## 2025-01-23 PROCEDURE — 73564 X-RAY EXAM KNEE 4 OR MORE: CPT | Mod: LEFT SIDE | Performed by: RADIOLOGY

## 2025-01-23 PROCEDURE — 1170F FXNL STATUS ASSESSED: CPT | Performed by: INTERNAL MEDICINE

## 2025-01-23 PROCEDURE — G0444 DEPRESSION SCREEN ANNUAL: HCPCS | Performed by: INTERNAL MEDICINE

## 2025-01-23 PROCEDURE — 99214 OFFICE O/P EST MOD 30 MIN: CPT | Performed by: INTERNAL MEDICINE

## 2025-01-23 PROCEDURE — G0439 PPPS, SUBSEQ VISIT: HCPCS | Performed by: INTERNAL MEDICINE

## 2025-01-23 PROCEDURE — G2211 COMPLEX E/M VISIT ADD ON: HCPCS | Performed by: INTERNAL MEDICINE

## 2025-01-23 PROCEDURE — 3008F BODY MASS INDEX DOCD: CPT | Performed by: INTERNAL MEDICINE

## 2025-01-23 PROCEDURE — 93000 ELECTROCARDIOGRAM COMPLETE: CPT | Performed by: INTERNAL MEDICINE

## 2025-01-23 PROCEDURE — 73564 X-RAY EXAM KNEE 4 OR MORE: CPT | Mod: LT

## 2025-01-23 PROCEDURE — 1159F MED LIST DOCD IN RCRD: CPT | Performed by: INTERNAL MEDICINE

## 2025-01-23 PROCEDURE — 1160F RVW MEDS BY RX/DR IN RCRD: CPT | Performed by: INTERNAL MEDICINE

## 2025-01-23 PROCEDURE — 3079F DIAST BP 80-89 MM HG: CPT | Performed by: INTERNAL MEDICINE

## 2025-01-23 PROCEDURE — 3077F SYST BP >= 140 MM HG: CPT | Performed by: INTERNAL MEDICINE

## 2025-01-23 PROCEDURE — 1036F TOBACCO NON-USER: CPT | Performed by: INTERNAL MEDICINE

## 2025-01-23 RX ORDER — TIRZEPATIDE 2.5 MG/.5ML
2.5 INJECTION, SOLUTION SUBCUTANEOUS
Qty: 2 ML | Refills: 0 | Status: SHIPPED | OUTPATIENT
Start: 2025-01-26 | End: 2025-02-25

## 2025-01-23 RX ORDER — LOSARTAN POTASSIUM AND HYDROCHLOROTHIAZIDE 25; 100 MG/1; MG/1
1 TABLET ORAL DAILY
Qty: 90 TABLET | Refills: 3 | Status: SHIPPED | OUTPATIENT
Start: 2025-01-23 | End: 2026-01-23

## 2025-01-23 ASSESSMENT — ACTIVITIES OF DAILY LIVING (ADL)
MANAGING_FINANCES: INDEPENDENT
DRESSING: INDEPENDENT
DOING_HOUSEWORK: INDEPENDENT
TAKING_MEDICATION: INDEPENDENT
BATHING: INDEPENDENT
GROCERY_SHOPPING: INDEPENDENT

## 2025-01-23 ASSESSMENT — ENCOUNTER SYMPTOMS
VOMITING: 0
ARTHRALGIAS: 0
WHEEZING: 0
TROUBLE SWALLOWING: 0
HEADACHES: 0
DIARRHEA: 0
BLOOD IN STOOL: 0
DIZZINESS: 0
LIGHT-HEADEDNESS: 0
CONSTIPATION: 0
CHEST TIGHTNESS: 0
UNEXPECTED WEIGHT CHANGE: 0
COUGH: 0
TREMORS: 0
NAUSEA: 0
DIFFICULTY URINATING: 0
ACTIVITY CHANGE: 0
VOICE CHANGE: 0
APPETITE CHANGE: 0
PALPITATIONS: 0
HEMATURIA: 0
FATIGUE: 0

## 2025-01-23 NOTE — ASSESSMENT & PLAN NOTE
A little worse but does not want to increase her prednisone          Health Maintenance Summary     Topic Due On Due Status Completed On    IMMUNIZATION - DTaP/Tdap/Td Feb 24, 2010 Overdue Feb 23, 2010    Immunization-Influenza Sep 1, 2018 Not Due     Depression Screening Jul 9, 2019 Not Due Jul 9, 2018          Patient is up to date, no discussion needed .

## 2025-01-23 NOTE — ASSESSMENT & PLAN NOTE
Home BPS are elevated 130-150/70-90  Orders:    ECG 12 lead (Clinic Performed)    CBC; Future    losartan-hydrochlorothiazide (Hyzaar) 100-25 mg tablet; Take 1 tablet by mouth once daily.

## 2025-01-23 NOTE — PROGRESS NOTES
"Subjective   Reason for Visit: Mojgan Villalobos is an 64 y.o. female here for a Medicare Wellness visit.     Past Medical, Surgical, and Family History reviewed and updated in chart.    Reviewed all medications by prescribing practitioner or clinical pharmacist (such as prescriptions, OTCs, herbal therapies and supplements) and documented in the medical record.    64 y.o. here for a AMWV/physical  Has been having more symptoms of her PMR  Her BP is running high  Has occ trouble swallowing - feels like food may get stuck    Knee pain on and off for last year - worsening    Retired -  [employer] - Nonsmoker  Alcohol - 1 glass of red wine every night with dinner   Marijuana  - denies  Exercise - 3  days a week            Patient Care Team:  Isabella Barrios MD as PCP - General  Isabella Barrios MD as PCP - [employer] PCP  Jerman Renner MD as Referring Physician (Retina Ophthalmology)  Sheri Waldron MD as Referring Physician (Ophthalmology)  Nolan Pugh DC as Referring Physician (Chiropractic Medicine)     Review of Systems   Constitutional:  Negative for activity change, appetite change, fatigue and unexpected weight change.   HENT:  Negative for ear pain, hearing loss, tinnitus, trouble swallowing and voice change.    Eyes:  Negative for visual disturbance.   Respiratory:  Negative for cough, chest tightness and wheezing.    Cardiovascular:  Negative for chest pain, palpitations and leg swelling.   Gastrointestinal:  Negative for blood in stool, constipation, diarrhea, nausea and vomiting.   Genitourinary:  Negative for difficulty urinating, hematuria and vaginal bleeding.   Musculoskeletal:  Negative for arthralgias.   Skin:  Negative for rash.   Neurological:  Negative for dizziness, tremors, syncope, light-headedness and headaches.       Objective   Vitals:  /84   Pulse 76   Temp 36.2 °C (97.2 °F)   Ht 1.594 m (5' 2.75\")   Wt 81.5 kg (179 lb 9.6 oz)   SpO2 99%   " BMI 32.07 kg/m²       Physical Exam  Constitutional:       General: She is not in acute distress.     Appearance: She is well-developed. She is not diaphoretic.   HENT:      Head: Normocephalic.      Right Ear: Tympanic membrane normal. There is no impacted cerumen.      Left Ear: Tympanic membrane normal. There is no impacted cerumen.      Nose: Nose normal.      Mouth/Throat:      Mouth: Mucous membranes are moist.      Pharynx: Oropharynx is clear. No oropharyngeal exudate or posterior oropharyngeal erythema.   Eyes:      General: No scleral icterus.     Extraocular Movements: Extraocular movements intact.      Conjunctiva/sclera: Conjunctivae normal.      Pupils: Pupils are equal, round, and reactive to light.   Neck:      Thyroid: No thyromegaly.      Vascular: No JVD.   Cardiovascular:      Rate and Rhythm: Normal rate and regular rhythm.      Pulses: Normal pulses.      Heart sounds: Normal heart sounds. No murmur heard.     No friction rub. No gallop.   Pulmonary:      Effort: Pulmonary effort is normal. No respiratory distress.      Breath sounds: Normal breath sounds. No wheezing or rales.   Chest:      Chest wall: No tenderness.   Abdominal:      General: Bowel sounds are normal. There is no distension.      Palpations: Abdomen is soft. There is no mass.      Tenderness: There is no abdominal tenderness. There is no rebound.   Musculoskeletal:         General: Normal range of motion.      Cervical back: Normal range of motion and neck supple.   Lymphadenopathy:      Cervical: No cervical adenopathy.   Skin:     General: Skin is warm and dry.   Neurological:      General: No focal deficit present.      Mental Status: She is alert and oriented to person, place, and time.      Deep Tendon Reflexes: Reflexes normal.   Psychiatric:         Mood and Affect: Mood normal.         Thought Content: Thought content normal.       Assessment & Plan  Routine general medical examination at a [facility]  facility    Orders:  •  CBC; Future  •  Comprehensive Metabolic Panel; Future  •  Lipid Panel; Future  •  TSH with reflex to Free T4 if abnormal; Future    Dysphagia, unspecified type  Possibly related to Sjogrens  Orders:  •  Esophagogastroduodenoscopy (EGD); Future    Chronic pain of left knee    Orders:  •  XR knee left 4+ views; Future    Essential hypertension  Home BPS are elevated 130-150/70-90  Orders:  •  ECG 12 lead (Clinic Performed)  •  CBC; Future  •  losartan-hydrochlorothiazide (Hyzaar) 100-25 mg tablet; Take 1 tablet by mouth once daily.    Class 1 obesity due to excess calories with serious comorbidity and body mass index (BMI) of 32.0 to 32.9 in adult  Would like to consider zepbound - sent to [facility]  Orders:  •  tirzepatide, weight loss, (Zepbound) 2.5 mg/0.5 mL solution; Inject 2.5 mg under the skin 1 (one) time per week.    Systemic lupus erythematosus, unspecified SLE type, unspecified organ involvement status (Multi)  Sees Dr Ariadna Noriega for now         PMR (polymyalgia rheumatica) (Multi)  A little worse but does not want to increase her prednisone          ACP discussion completed - has documents      Depression Screening  5 - 10 minutes were spent screening for depression.    Follow up with me in 6 months

## 2025-01-24 DIAGNOSIS — F51.01 PRIMARY INSOMNIA: ICD-10-CM

## 2025-01-27 DIAGNOSIS — Z12.11 COLON CANCER SCREENING: ICD-10-CM

## 2025-01-27 RX ORDER — ZOLPIDEM TARTRATE 10 MG/1
10 TABLET ORAL DAILY
Qty: 90 TABLET | Refills: 0 | Status: SHIPPED | OUTPATIENT
Start: 2025-01-27 | End: 2025-04-27

## 2025-01-28 RX ORDER — SODIUM, POTASSIUM,MAG SULFATES 17.5-3.13G
1 SOLUTION, RECONSTITUTED, ORAL ORAL 2 TIMES DAILY
Qty: 1 EACH | Refills: 0 | Status: SHIPPED | OUTPATIENT
Start: 2025-01-28 | End: 2025-01-29

## 2025-02-04 DIAGNOSIS — M35.3 PMR (POLYMYALGIA RHEUMATICA) (MULTI): ICD-10-CM

## 2025-02-04 RX ORDER — PREDNISONE 1 MG/1
TABLET ORAL
Qty: 30 TABLET | Refills: 5 | Status: SHIPPED | OUTPATIENT
Start: 2025-02-04

## 2025-02-07 ENCOUNTER — TELEPHONE (OUTPATIENT)
Dept: GASTROENTEROLOGY | Facility: CLINIC | Age: 65
End: 2025-02-07
Payer: MEDICARE

## 2025-02-07 NOTE — TELEPHONE ENCOUNTER
Spoke with patient re: colon on 4/16/25. She states she is not taking Zepbound as this medication was too expensive.

## 2025-03-03 DIAGNOSIS — M32.9 SYSTEMIC LUPUS ERYTHEMATOSUS, UNSPECIFIED SLE TYPE, UNSPECIFIED ORGAN INVOLVEMENT STATUS (MULTI): ICD-10-CM

## 2025-03-03 DIAGNOSIS — I10 ESSENTIAL HYPERTENSION: ICD-10-CM

## 2025-03-03 DIAGNOSIS — M35.00 SJOGREN'S SYNDROME, WITH UNSPECIFIED ORGAN INVOLVEMENT (MULTI): ICD-10-CM

## 2025-03-03 RX ORDER — CEVIMELINE HYDROCHLORIDE 30 MG/1
30 CAPSULE ORAL 3 TIMES DAILY
Qty: 270 CAPSULE | Refills: 0 | Status: SHIPPED | OUTPATIENT
Start: 2025-03-03

## 2025-03-03 RX ORDER — AMLODIPINE BESYLATE 5 MG/1
5 TABLET ORAL DAILY
Qty: 90 TABLET | Refills: 1 | Status: SHIPPED | OUTPATIENT
Start: 2025-03-03

## 2025-03-03 RX ORDER — HYDROXYCHLOROQUINE SULFATE 200 MG/1
200 TABLET, FILM COATED ORAL DAILY
Qty: 135 TABLET | Refills: 0 | Status: SHIPPED | OUTPATIENT
Start: 2025-03-03

## 2025-03-17 DIAGNOSIS — M32.9 SYSTEMIC LUPUS ERYTHEMATOSUS, UNSPECIFIED SLE TYPE, UNSPECIFIED ORGAN INVOLVEMENT STATUS (MULTI): ICD-10-CM

## 2025-03-17 DIAGNOSIS — M35.3 PMR (POLYMYALGIA RHEUMATICA) (MULTI): ICD-10-CM

## 2025-03-17 RX ORDER — HYDROXYCHLOROQUINE SULFATE 200 MG/1
300 TABLET, FILM COATED ORAL DAILY
Qty: 135 TABLET | Refills: 1 | Status: SHIPPED | OUTPATIENT
Start: 2025-03-17

## 2025-03-17 RX ORDER — PREDNISONE 5 MG/1
TABLET ORAL
Qty: 30 TABLET | Refills: 3 | Status: SHIPPED | OUTPATIENT
Start: 2025-03-17

## 2025-03-17 RX ORDER — HYDROXYCHLOROQUINE SULFATE 200 MG/1
300 TABLET, FILM COATED ORAL DAILY
Qty: 135 TABLET | Refills: 0 | Status: SHIPPED | OUTPATIENT
Start: 2025-03-17 | End: 2025-03-17 | Stop reason: SDUPTHER

## 2025-03-22 LAB
ALBUMIN SERPL-MCNC: 4.1 G/DL (ref 3.6–5.1)
ALP SERPL-CCNC: 41 U/L (ref 37–153)
ALT SERPL-CCNC: 20 U/L (ref 6–29)
ANION GAP SERPL CALCULATED.4IONS-SCNC: 7 MMOL/L (CALC) (ref 7–17)
ANION GAP SERPL CALCULATED.4IONS-SCNC: 8 MMOL/L (CALC) (ref 7–17)
AST SERPL-CCNC: 22 U/L (ref 10–35)
BASOPHILS # BLD AUTO: 51 CELLS/UL (ref 0–200)
BASOPHILS NFR BLD AUTO: 1 %
BILIRUB SERPL-MCNC: 0.4 MG/DL (ref 0.2–1.2)
BUN SERPL-MCNC: 17 MG/DL (ref 7–25)
BUN SERPL-MCNC: 17 MG/DL (ref 7–25)
BUN/CREAT SERPL: NORMAL (CALC) (ref 6–22)
CALCIUM SERPL-MCNC: 9.6 MG/DL (ref 8.6–10.4)
CALCIUM SERPL-MCNC: 9.7 MG/DL (ref 8.6–10.4)
CHLORIDE SERPL-SCNC: 102 MMOL/L (ref 98–110)
CHLORIDE SERPL-SCNC: 103 MMOL/L (ref 98–110)
CHOLEST SERPL-MCNC: 215 MG/DL
CHOLEST/HDLC SERPL: 2.5 (CALC)
CO2 SERPL-SCNC: 29 MMOL/L (ref 20–32)
CO2 SERPL-SCNC: 30 MMOL/L (ref 20–32)
CREAT SERPL-MCNC: 0.79 MG/DL (ref 0.5–1.05)
CREAT SERPL-MCNC: 0.82 MG/DL (ref 0.5–1.05)
CREAT UR-MCNC: NORMAL MG/DL
CRP SERPL-MCNC: NORMAL MG/L
EGFRCR SERPLBLD CKD-EPI 2021: 79 ML/MIN/1.73M2
EGFRCR SERPLBLD CKD-EPI 2021: 82 ML/MIN/1.73M2
EOSINOPHIL # BLD AUTO: 153 CELLS/UL (ref 15–500)
EOSINOPHIL NFR BLD AUTO: 3 %
ERYTHROCYTE [DISTWIDTH] IN BLOOD BY AUTOMATED COUNT: 13.8 % (ref 11–15)
ERYTHROCYTE [DISTWIDTH] IN BLOOD BY AUTOMATED COUNT: 13.9 % (ref 11–15)
ERYTHROCYTE [SEDIMENTATION RATE] IN BLOOD BY WESTERGREN METHOD: 6 MM/H
GLUCOSE SERPL-MCNC: 83 MG/DL (ref 65–99)
GLUCOSE SERPL-MCNC: 84 MG/DL (ref 65–99)
HCT VFR BLD AUTO: 36.7 % (ref 35–45)
HCT VFR BLD AUTO: 36.8 % (ref 35–45)
HDLC SERPL-MCNC: 87 MG/DL
HGB BLD-MCNC: 12.1 G/DL (ref 11.7–15.5)
HGB BLD-MCNC: 12.2 G/DL (ref 11.7–15.5)
LDLC SERPL CALC-MCNC: 106 MG/DL (CALC)
LYMPHOCYTES # BLD AUTO: 1637 CELLS/UL (ref 850–3900)
LYMPHOCYTES NFR BLD AUTO: 32.1 %
MCH RBC QN AUTO: 29.7 PG (ref 27–33)
MCH RBC QN AUTO: 29.8 PG (ref 27–33)
MCHC RBC AUTO-ENTMCNC: 33 G/DL (ref 32–36)
MCHC RBC AUTO-ENTMCNC: 33.2 G/DL (ref 32–36)
MCV RBC AUTO: 89.8 FL (ref 80–100)
MCV RBC AUTO: 90.2 FL (ref 80–100)
MONOCYTES # BLD AUTO: 536 CELLS/UL (ref 200–950)
MONOCYTES NFR BLD AUTO: 10.5 %
NEUTROPHILS # BLD AUTO: 2723 CELLS/UL (ref 1500–7800)
NEUTROPHILS NFR BLD AUTO: 53.4 %
NONHDLC SERPL-MCNC: 128 MG/DL (CALC)
PLATELET # BLD AUTO: 297 THOUSAND/UL (ref 140–400)
PLATELET # BLD AUTO: 311 THOUSAND/UL (ref 140–400)
PMV BLD REES-ECKER: 9.5 FL (ref 7.5–12.5)
PMV BLD REES-ECKER: 9.6 FL (ref 7.5–12.5)
POTASSIUM SERPL-SCNC: 3.4 MMOL/L (ref 3.5–5.3)
POTASSIUM SERPL-SCNC: 3.5 MMOL/L (ref 3.5–5.3)
PROT SERPL-MCNC: 6.5 G/DL (ref 6.1–8.1)
RBC # BLD AUTO: 4.07 MILLION/UL (ref 3.8–5.1)
RBC # BLD AUTO: 4.1 MILLION/UL (ref 3.8–5.1)
SODIUM SERPL-SCNC: 139 MMOL/L (ref 135–146)
SODIUM SERPL-SCNC: 140 MMOL/L (ref 135–146)
TRIGL SERPL-MCNC: 122 MG/DL
TSH SERPL-ACNC: 2.24 MIU/L (ref 0.4–4.5)
WBC # BLD AUTO: 5.1 THOUSAND/UL (ref 3.8–10.8)
WBC # BLD AUTO: 5.5 THOUSAND/UL (ref 3.8–10.8)

## 2025-03-24 LAB
ANION GAP SERPL CALCULATED.4IONS-SCNC: 8 MMOL/L (CALC) (ref 7–17)
BASOPHILS # BLD AUTO: 51 CELLS/UL (ref 0–200)
BASOPHILS NFR BLD AUTO: 1 %
BUN SERPL-MCNC: 17 MG/DL (ref 7–25)
BUN/CREAT SERPL: NORMAL (CALC) (ref 6–22)
CALCIUM SERPL-MCNC: 9.6 MG/DL (ref 8.6–10.4)
CHLORIDE SERPL-SCNC: 102 MMOL/L (ref 98–110)
CO2 SERPL-SCNC: 29 MMOL/L (ref 20–32)
CREAT SERPL-MCNC: 0.82 MG/DL (ref 0.5–1.05)
CREAT UR-MCNC: 98 MG/DL (ref 20–275)
CRP SERPL-MCNC: 3.4 MG/L
EGFRCR SERPLBLD CKD-EPI 2021: 79 ML/MIN/1.73M2
EOSINOPHIL # BLD AUTO: 153 CELLS/UL (ref 15–500)
EOSINOPHIL NFR BLD AUTO: 3 %
ERYTHROCYTE [DISTWIDTH] IN BLOOD BY AUTOMATED COUNT: 13.9 % (ref 11–15)
ERYTHROCYTE [SEDIMENTATION RATE] IN BLOOD BY WESTERGREN METHOD: 6 MM/H
GLUCOSE SERPL-MCNC: 83 MG/DL (ref 65–99)
HCT VFR BLD AUTO: 36.8 % (ref 35–45)
HGB BLD-MCNC: 12.2 G/DL (ref 11.7–15.5)
LYMPHOCYTES # BLD AUTO: 1637 CELLS/UL (ref 850–3900)
LYMPHOCYTES NFR BLD AUTO: 32.1 %
MCH RBC QN AUTO: 29.8 PG (ref 27–33)
MCHC RBC AUTO-ENTMCNC: 33.2 G/DL (ref 32–36)
MCV RBC AUTO: 89.8 FL (ref 80–100)
MONOCYTES # BLD AUTO: 536 CELLS/UL (ref 200–950)
MONOCYTES NFR BLD AUTO: 10.5 %
NEUTROPHILS # BLD AUTO: 2723 CELLS/UL (ref 1500–7800)
NEUTROPHILS NFR BLD AUTO: 53.4 %
PLATELET # BLD AUTO: 311 THOUSAND/UL (ref 140–400)
PMV BLD REES-ECKER: 9.5 FL (ref 7.5–12.5)
POTASSIUM SERPL-SCNC: 3.5 MMOL/L (ref 3.5–5.3)
RBC # BLD AUTO: 4.1 MILLION/UL (ref 3.8–5.1)
SODIUM SERPL-SCNC: 139 MMOL/L (ref 135–146)
WBC # BLD AUTO: 5.1 THOUSAND/UL (ref 3.8–10.8)

## 2025-03-26 LAB
CREAT UR-MCNC: 100 MG/DL (ref 20–275)
PROT UR-MCNC: 8 MG/DL (ref 5–24)
PROT/CREAT UR: 0.08 MG/MG CREAT (ref 0.02–0.18)
PROT/CREAT UR: 80 MG/G CREAT (ref 24–184)

## 2025-03-27 ENCOUNTER — APPOINTMENT (OUTPATIENT)
Dept: RHEUMATOLOGY | Facility: CLINIC | Age: 65
End: 2025-03-27
Payer: MEDICARE

## 2025-03-27 VITALS
OXYGEN SATURATION: 98 % | WEIGHT: 177 LBS | HEIGHT: 63 IN | TEMPERATURE: 97.7 F | HEART RATE: 70 BPM | RESPIRATION RATE: 14 BRPM | SYSTOLIC BLOOD PRESSURE: 135 MMHG | BODY MASS INDEX: 31.36 KG/M2 | DIASTOLIC BLOOD PRESSURE: 84 MMHG

## 2025-03-27 DIAGNOSIS — M32.9 SYSTEMIC LUPUS ERYTHEMATOSUS, UNSPECIFIED SLE TYPE, UNSPECIFIED ORGAN INVOLVEMENT STATUS (MULTI): Primary | ICD-10-CM

## 2025-03-27 DIAGNOSIS — M35.00 SJOGREN'S SYNDROME, WITH UNSPECIFIED ORGAN INVOLVEMENT (MULTI): ICD-10-CM

## 2025-03-27 DIAGNOSIS — M35.3 PMR (POLYMYALGIA RHEUMATICA) (MULTI): ICD-10-CM

## 2025-03-27 DIAGNOSIS — I10 PRIMARY HYPERTENSION: ICD-10-CM

## 2025-03-27 DIAGNOSIS — M17.12 PRIMARY OSTEOARTHRITIS OF LEFT KNEE: ICD-10-CM

## 2025-03-27 PROCEDURE — 1160F RVW MEDS BY RX/DR IN RCRD: CPT | Performed by: INTERNAL MEDICINE

## 2025-03-27 PROCEDURE — 1159F MED LIST DOCD IN RCRD: CPT | Performed by: INTERNAL MEDICINE

## 2025-03-27 PROCEDURE — 20610 DRAIN/INJ JOINT/BURSA W/O US: CPT | Performed by: INTERNAL MEDICINE

## 2025-03-27 PROCEDURE — 3079F DIAST BP 80-89 MM HG: CPT | Performed by: INTERNAL MEDICINE

## 2025-03-27 PROCEDURE — 1125F AMNT PAIN NOTED PAIN PRSNT: CPT | Performed by: INTERNAL MEDICINE

## 2025-03-27 PROCEDURE — 3075F SYST BP GE 130 - 139MM HG: CPT | Performed by: INTERNAL MEDICINE

## 2025-03-27 PROCEDURE — 99214 OFFICE O/P EST MOD 30 MIN: CPT | Performed by: INTERNAL MEDICINE

## 2025-03-27 PROCEDURE — 3008F BODY MASS INDEX DOCD: CPT | Performed by: INTERNAL MEDICINE

## 2025-03-27 PROCEDURE — 1036F TOBACCO NON-USER: CPT | Performed by: INTERNAL MEDICINE

## 2025-03-27 RX ORDER — TRIAMCINOLONE ACETONIDE 40 MG/ML
40 INJECTION, SUSPENSION INTRA-ARTICULAR; INTRAMUSCULAR
Status: COMPLETED | OUTPATIENT
Start: 2025-03-27 | End: 2025-03-27

## 2025-03-27 RX ORDER — LIDOCAINE HYDROCHLORIDE 10 MG/ML
4 INJECTION, SOLUTION INFILTRATION; PERINEURAL
Status: COMPLETED | OUTPATIENT
Start: 2025-03-27 | End: 2025-03-27

## 2025-03-27 RX ORDER — LOSARTAN POTASSIUM AND HYDROCHLOROTHIAZIDE 12.5; 1 MG/1; MG/1
1 TABLET ORAL DAILY
Qty: 30 TABLET | Refills: 5 | Status: SHIPPED | OUTPATIENT
Start: 2025-03-27 | End: 2026-03-27

## 2025-03-27 RX ADMIN — LIDOCAINE HYDROCHLORIDE 4 ML: 10 INJECTION, SOLUTION INFILTRATION; PERINEURAL at 18:40

## 2025-03-27 RX ADMIN — TRIAMCINOLONE ACETONIDE 40 MG: 40 INJECTION, SUSPENSION INTRA-ARTICULAR; INTRAMUSCULAR at 18:40

## 2025-03-27 ASSESSMENT — PAIN SCALES - GENERAL: PAINLEVEL_OUTOF10: 4
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
## 2025-03-27 NOTE — PATIENT INSTRUCTIONS
Change hyzaar to 100-12.5 mg daily, due to dry lips.  Check labs 6/25: ESR, CRP, CBC with diff, BMP, spot urine protein to creatinine ratio.  Left knee was injected today with Kenalog.  Follow-up in 3 months.

## 2025-04-01 ENCOUNTER — ANESTHESIA EVENT (OUTPATIENT)
Dept: GASTROENTEROLOGY | Facility: EXTERNAL LOCATION | Age: 65
End: 2025-04-01

## 2025-04-16 ENCOUNTER — ANESTHESIA (OUTPATIENT)
Dept: GASTROENTEROLOGY | Facility: EXTERNAL LOCATION | Age: 65
End: 2025-04-16

## 2025-04-16 ENCOUNTER — APPOINTMENT (OUTPATIENT)
Dept: GASTROENTEROLOGY | Facility: EXTERNAL LOCATION | Age: 65
End: 2025-04-16
Payer: MEDICARE

## 2025-04-16 VITALS
WEIGHT: 170 LBS | SYSTOLIC BLOOD PRESSURE: 123 MMHG | RESPIRATION RATE: 14 BRPM | BODY MASS INDEX: 30.12 KG/M2 | TEMPERATURE: 96.8 F | DIASTOLIC BLOOD PRESSURE: 70 MMHG | OXYGEN SATURATION: 100 % | HEIGHT: 63 IN | HEART RATE: 58 BPM

## 2025-04-16 DIAGNOSIS — Z12.11 SCREENING FOR COLORECTAL CANCER: ICD-10-CM

## 2025-04-16 DIAGNOSIS — R13.10 DYSPHAGIA, UNSPECIFIED TYPE: ICD-10-CM

## 2025-04-16 DIAGNOSIS — Z12.12 SCREENING FOR COLORECTAL CANCER: ICD-10-CM

## 2025-04-16 PROCEDURE — 43239 EGD BIOPSY SINGLE/MULTIPLE: CPT | Performed by: INTERNAL MEDICINE

## 2025-04-16 PROCEDURE — 45385 COLONOSCOPY W/LESION REMOVAL: CPT | Performed by: INTERNAL MEDICINE

## 2025-04-16 RX ORDER — FENTANYL CITRATE 50 UG/ML
INJECTION, SOLUTION INTRAMUSCULAR; INTRAVENOUS AS NEEDED
Status: DISCONTINUED | OUTPATIENT
Start: 2025-04-16 | End: 2025-04-16

## 2025-04-16 RX ORDER — SODIUM CHLORIDE 9 MG/ML
INJECTION, SOLUTION INTRAVENOUS CONTINUOUS PRN
Status: DISCONTINUED | OUTPATIENT
Start: 2025-04-16 | End: 2025-04-16

## 2025-04-16 RX ORDER — LIDOCAINE HYDROCHLORIDE 20 MG/ML
INJECTION, SOLUTION INFILTRATION; PERINEURAL AS NEEDED
Status: DISCONTINUED | OUTPATIENT
Start: 2025-04-16 | End: 2025-04-16

## 2025-04-16 RX ORDER — ONDANSETRON HYDROCHLORIDE 2 MG/ML
4 INJECTION, SOLUTION INTRAVENOUS ONCE AS NEEDED
Status: DISCONTINUED | OUTPATIENT
Start: 2025-04-16 | End: 2025-04-17 | Stop reason: HOSPADM

## 2025-04-16 RX ORDER — PROPOFOL 10 MG/ML
INJECTION, EMULSION INTRAVENOUS AS NEEDED
Status: DISCONTINUED | OUTPATIENT
Start: 2025-04-16 | End: 2025-04-16

## 2025-04-16 RX ADMIN — FENTANYL CITRATE 25 MCG: 50 INJECTION, SOLUTION INTRAMUSCULAR; INTRAVENOUS at 11:37

## 2025-04-16 RX ADMIN — LIDOCAINE HYDROCHLORIDE 100 MG: 20 INJECTION, SOLUTION INFILTRATION; PERINEURAL at 11:13

## 2025-04-16 RX ADMIN — FENTANYL CITRATE 25 MCG: 50 INJECTION, SOLUTION INTRAMUSCULAR; INTRAVENOUS at 11:30

## 2025-04-16 RX ADMIN — PROPOFOL 150 MG: 10 INJECTION, EMULSION INTRAVENOUS at 11:13

## 2025-04-16 RX ADMIN — PROPOFOL 50 MG: 10 INJECTION, EMULSION INTRAVENOUS at 11:19

## 2025-04-16 RX ADMIN — PROPOFOL 50 MG: 10 INJECTION, EMULSION INTRAVENOUS at 11:15

## 2025-04-16 RX ADMIN — SODIUM CHLORIDE: 9 INJECTION, SOLUTION INTRAVENOUS at 11:10

## 2025-04-16 RX ADMIN — PROPOFOL 50 MG: 10 INJECTION, EMULSION INTRAVENOUS at 11:23

## 2025-04-16 RX ADMIN — PROPOFOL 50 MG: 10 INJECTION, EMULSION INTRAVENOUS at 11:24

## 2025-04-16 RX ADMIN — PROPOFOL 50 MG: 10 INJECTION, EMULSION INTRAVENOUS at 11:37

## 2025-04-16 RX ADMIN — PROPOFOL 100 MG: 10 INJECTION, EMULSION INTRAVENOUS at 11:28

## 2025-04-16 SDOH — HEALTH STABILITY: MENTAL HEALTH: CURRENT SMOKER: 0

## 2025-04-16 ASSESSMENT — PAIN SCALES - GENERAL
PAIN_LEVEL: 0
PAINLEVEL_OUTOF10: 0 - NO PAIN

## 2025-04-16 ASSESSMENT — COLUMBIA-SUICIDE SEVERITY RATING SCALE - C-SSRS
1. IN THE PAST MONTH, HAVE YOU WISHED YOU WERE DEAD OR WISHED YOU COULD GO TO SLEEP AND NOT WAKE UP?: NO
6. HAVE YOU EVER DONE ANYTHING, STARTED TO DO ANYTHING, OR PREPARED TO DO ANYTHING TO END YOUR LIFE?: NO
2. HAVE YOU ACTUALLY HAD ANY THOUGHTS OF KILLING YOURSELF?: NO

## 2025-04-16 ASSESSMENT — PAIN - FUNCTIONAL ASSESSMENT
PAIN_FUNCTIONAL_ASSESSMENT: 0-10

## 2025-04-16 NOTE — ANESTHESIA PREPROCEDURE EVALUATION
Patient: Mojgan Villalobos    Procedure Information       Date/Time: 04/16/25 1040    Scheduled providers: Bruna HINOJOSA MD; Kelly Cuevas RN; GINNA Serrano-CRNA    Procedures:       EGD      COLONOSCOPY    Location: [facility]            Relevant Problems   Cardiac   (+) Abnormal EKG   (+) Essential hypertension      Neuro   (+) Piriformis syndrome      GI   (+) GERD (gastroesophageal reflux disease)      Endocrine   (+) Class 1 obesity with body mass index (BMI) of 31.0 to 31.9 in adult      Musculoskeletal   (+) Fibromyalgia   (+) Primary osteoarthritis of left knee       Clinical information reviewed:   Tobacco  Allergies  Meds   Med Hx  Surg Hx  OB Status  Fam Hx  Soc   Hx        NPO Detail:  NPO/Void Status  Carbohydrate Drink Given Prior to Surgery? : N  Date of Last Liquid: 04/16/25  Time of Last Liquid: 0600  Date of Last Solid: 04/14/25  Time of Last Solid: 1900  Last Intake Type: Clear fluids  Time of Last Void: 1029         Physical Exam    Airway  Mallampati: II  TM distance: >3 FB  Neck ROM: full     Cardiovascular   Rhythm: regular  Rate: normal     Dental - normal exam     Pulmonary - normal examBreath sounds clear to auscultation     Abdominal (+) obese       Other findings: Daily wine        Anesthesia Plan    History of general anesthesia?: yes  History of complications of general anesthesia?: no    ASA 2     MAC   (Preoxygenated 2L prior to procedure.  Patient positioned self to comfort prior to sedation administered; eyes closed; continuous monitoring.)  The patient is not a current smoker.    intravenous induction   Anesthetic plan and risks discussed with patient.    Plan discussed with CRNA.

## 2025-04-16 NOTE — DISCHARGE INSTRUCTIONS
Patient Instructions Post Endoscopy Procedure      The anesthetics, sedatives or narcotics which were given to you today will be acting in your body for the next 24 hours, so you might feel a little sleepy or groggy.  This feeling should slowly wear off. Carefully read and follow the instructions.     You received sedation today:  - Do not drive or operate any machinery or power tools of any kind.   - No alcoholic beverages today, not even beer or wine.  - Do not make any important decisions or sign any legal documents.  - No over the counter medications that contain alcohol or that may cause drowsiness.    While it is common to experience mild to moderate abdominal distention, gas, or belching after your procedure, if any of these symptoms occur following discharge from the GI Lab or within one week of having your procedure, call the [facility] to be advised whether a visit to your nearest Urgent Care or Emergency Department is indicated.  Take this paper with you if you go.   - If you develop an allergic reaction to the medications that were given during your procedure such as difficulty breathing, rash, hives, severe nausea, vomiting or lightheadedness.  - If you experience chest pain, shortness of breath, severe abdominal pain, fevers and chills.  -If you develop signs and symptoms of bleeding such as blood in your spit, if your stools turn black, tarry, or bloody  - If you have not urinated within 8 hours following your procedure.  - If your IV site becomes painful, red, inflamed, or looks infected.    If you received a biopsy/polypectomy the following instructions apply below:  _x_ Do not use non-steroidal medications or anti-coagulants for one week following your procedure. (Examples of these types of medications are: Advil, Arthrotec, Aleve, Coumadin, Ecotrin, Heparin, Ibuprofen, Indocin, Motrin, Naprosyn, Nuprin, Plavix, Vioxx, and Voltarin, or their generic forms.  This list is not  all-inclusive.  Check with your physician or pharmacist before resuming medications.)   _x_ Eat a soft diet today.  Avoid foods that are poorly digested for the next 24 hours.  These foods would include: nuts, beans, lettuce, red meats, and fried foods. Start with liquids and advance your diet as tolerated, gradually work up to eating solids.   _x_ You can restart your ASA tomorrow  __ You can resume your anticoagulation therapy -     Your physician recommends the additional following instructions:    -You have a contact number available for emergencies. The signs and symptoms of potential delayed complications were discussed with you. You may return to normal activities tomorrow.  -Resume your previous diet or other if specified.  -Continue your present medications.   -We are waiting for your pathology results, if applicable. The results will be available in [place]. I will send you a message with any recommendations.  -The findings and recommendations have been discussed with you and/or family.  -Please see Medication Reconciliation Form for new medication/medications prescribed.     If you experience any problems or have any questions following discharge from the GI Lab, please call: [phone] from 7 am- 4:30 pm.  In the event of an emergency please go to the closest Emergency Department or call Dr. Harris at [phone]

## 2025-04-16 NOTE — SIGNIFICANT EVENT
"C/O \"gas pains\" encouraged to roll side to side; Dr Harris to speak with pt;tolerating po fluids and cookies  "

## 2025-04-16 NOTE — ANESTHESIA POSTPROCEDURE EVALUATION
Patient: Mojgan Villalobos    Procedure Summary       Date: 04/16/25 Room / Location: [facility]    Anesthesia Start: 1108 Anesthesia Stop:     Procedures:       EGD      COLONOSCOPY Diagnosis:       Dysphagia, unspecified type      Screening for colorectal cancer    Scheduled Providers: Bruna HINOJOSA MD; Kelly Cuevas RN; SCOTT Serrano Responsible Provider: SCOTT Serrano    Anesthesia Type: MAC ASA Status: 2            Anesthesia Type: MAC    Vitals Value Taken Time   /73 04/16/25 11:45   Temp 36 04/16/25 11:45   Pulse 70 04/16/25 11:45   Resp 10 04/16/25 11:45   SpO2 100 04/16/25 11:45       Anesthesia Post Evaluation    Patient location during evaluation: bedside  Patient participation: complete - patient participated  Level of consciousness: awake  Pain score: 0  Pain management: adequate  Airway patency: patent  Cardiovascular status: acceptable  Respiratory status: acceptable and room air  Hydration status: acceptable  Postoperative Nausea and Vomiting: none        No notable events documented.

## 2025-04-16 NOTE — H&P
Outpatient Hospital Procedure    Patient Profile-Procedures  Initial Info  Patient Demographics  Name Mojgan Villalobos  Date of Birth 1960  MRN [id number]  Address   [address] [facility] [address] [phone] [facility] [address]    Primary Phone Number [phone]  Secondary Phone Number    PCP Isabella Barrios    Procedures   EGD   and Colonoscopy      Indication:  Dysphagia  [place] - TA 2020    Primary contact name and number   Extended Emergency Contact Information  Primary Emergency Contact: Cristian Villalobos  Home Phone: [phone]  Relation: Spouse    General Health  Weight   Vitals:    04/16/25 1027   Weight: 77.1 kg (170 lb)     BMI Body mass index is 30.11 kg/m².    Allergies  RX Allergies[1]    Past Medical History   Medical History[2]    Provider assessment  Diagnosis  Medication Reviewed - yes  Prior to Admission medications    Medication Sig Start Date End Date Taking? Authorizing Provider   alendronate (Fosamax) 70 mg tablet TAKE 1 TABLET EVERY 7 DAYS IN THE MORNING WITH A FULL GLASS OF WATER, ON AN EMPTY STOMACH AND DO NOT TAKE ANYTHING ELSE BY MOUTH OR LIE DOWN FOR THE NEXT 30 MINUTES 1/6/25  Yes Jewell Mauricio MD   amLODIPine (Norvasc) 5 mg tablet TAKE 1 TABLET ONCE DAILY 3/3/25  Yes Ally Lawrence MD   biotin 10,000 mcg capsule Take by mouth. 3/16/22  Yes Historical Provider, MD roldanmethyl-[id number] (Refresh Optive Jossue-3, PF,) 0.5-1-0.5 % dropperette Administer into affected eye(s). Both eyes daily   Yes Historical Provider, MD   atbkmyujdntpuam-[id number] 0.5-1-0.5 % drops Administer into affected eye(s). 8/4/21  Yes Historical Provider, MD   cevimeline (Evoxac) 30 mg capsule TAKE 1 CAPSULE 3 TIMES A   DAY 3/3/25  Yes Ally Lawrence MD   co-enzyme Q-10 30 mg capsule Take 1 capsule (30 mg) by mouth once daily.   Yes Historical Provider, MD   cyanocobalamin (Vitamin B-12) 100 mcg tablet Take by mouth. 4/15/19  Yes Historical Provider, MD    esomeprazole (NexIUM) 40 mg DR capsule Take 1 capsule (40 mg) by mouth once daily in the morning. Take before meals. 6/24/24  Yes Jewell Mauricio MD   fish oil concentrate (Omega-3) 120-180 mg capsule Take by mouth. 4/15/19  Yes Historical Provider, MD   fluticasone (Flonase) 50 mcg/actuation nasal spray Administer into affected nostril(s) once daily. 7/20/15  Yes Historical Provider, MD   hydroxychloroquine (Plaquenil) 200 mg tablet Take 1.5 tablets (300 mg) by mouth once daily. 3/17/25  Yes Jewell Mauricio MD   losartan-hydrochlorothiazide (Hyzaar) 100-12.5 mg tablet Take 1 tablet by mouth once daily. 3/27/25 3/27/26 Yes Jewell Mauricio MD   predniSONE (Deltasone) 5 mg tablet TAKE 1 TABLET DAILY 3/17/25  Yes Jewell Mauricio MD   pyridoxine (Vitamin B-6) 100 mg tablet Take by mouth. 3/16/22  Yes Historical Provider, MD   Restasis 0.05 % ophthalmic emulsion Administer 1 drop into both eyes 2 times a day.   Yes Historical Provider, MD   zolpidem (Ambien) 10 mg tablet Take 1 tablet (10 mg) by mouth once daily. 1/27/25 4/27/25 Yes Isabella Barrios MD   losartan-hydrochlorothiazide (Hyzaar) 100-25 mg tablet Take 1 tablet by mouth once daily. 1/23/25 4/16/25  Isabella Barrios MD   predniSONE (Deltasone) 1 mg tablet TAKE 1 TAB DAILY (WITH 5 MG TABLET). 2/4/25 4/16/25  Jewell Mauricio MD   white petrolatum-mineral oiL (Tears Naturale PM) 94-3 % ophthalmic ointment Apply 1 Application to both eyes. At bedtime  4/16/25  Historical Provider, MD       This is my H&P    Physical Exam  Physical Exam  Constitutional:       Comments: Awake   HENT:      Head: Normocephalic.   Cardiovascular:      Rate and Rhythm: Normal rate and regular rhythm.   Pulmonary:      Effort: Pulmonary effort is normal.      Breath sounds: Normal breath sounds.   Abdominal:      General: Bowel sounds are normal.      Palpations: Abdomen is soft.   Neurological:      Mental Status: She is alert.   Psychiatric:          Mood and Affect: Mood normal.           Oropharyngeal Classification II (hard and soft palate, upper portion of tonsils and uvula visible)  ASA PS Classification 2  Sedation Plan Deep  Procedure Plan - pre-procedural (re)assesment completed by physician:  discharge/transfer patient when discharge criteria met    Bruna Harris MD  4/16/2025 11:11 AM           [1]   Allergies  Allergen Reactions    Amoxicillin Rash    Hydrocodone-Acetaminophen Rash   [2]   Past Medical History:  Diagnosis Date    Actinic keratosis 07/29/2013    Actinic keratosis    Acute upper respiratory infection, unspecified 09/10/2013    Acute upper respiratory infection    Bell's palsy     Bell's palsy    Disturbances of salivary secretion     Xerostomia    Encounter for examination of eyes and vision without abnormal findings     Encounter for ophthalmic examination and evaluation    Encounter for examination of eyes and vision without abnormal findings     Encounter for ophthalmic examination and evaluation    Encounter for examination of eyes and vision without abnormal findings     Encounter for ophthalmic examination and evaluation    Encounter for gynecological examination (general) (routine) without abnormal findings     Encounter for gynecological examination without abnormal finding    Encounter for screening mammogram for malignant neoplasm of breast     Visit for screening mammogram    Encounter for screening mammogram for malignant neoplasm of breast     Visit for screening mammogram    Fibromyalgia     Pain in unspecified hip 07/08/2014    Joint pain, hip    Personal history of other diseases of the circulatory system     History of hypertension    Personal history of other diseases of the musculoskeletal system and connective tissue 07/14/2014    History of low back pain    Personal history of other diseases of the respiratory system     History of allergic rhinitis    Personal history of other medical treatment     History of  stress test    Personal history of other mental and behavioral disorders     History of depression    Plantar fascial fibromatosis 11/23/2015    Plantar fasciitis    Systemic lupus erythematosus, unspecified     History of systemic lupus erythematosus (SLE)    Unspecified sprain of unspecified hip, initial encounter 07/03/2014    Hip sprain

## 2025-04-24 DIAGNOSIS — F51.01 PRIMARY INSOMNIA: ICD-10-CM

## 2025-04-24 LAB
LABORATORY COMMENT REPORT: NORMAL
PATH REPORT.FINAL DX SPEC: NORMAL
PATH REPORT.GROSS SPEC: NORMAL
PATH REPORT.TOTAL CANCER: NORMAL

## 2025-04-24 RX ORDER — ZOLPIDEM TARTRATE 10 MG/1
10 TABLET ORAL DAILY
Qty: 90 TABLET | Refills: 1 | Status: SHIPPED | OUTPATIENT
Start: 2025-04-24

## 2025-06-04 ENCOUNTER — APPOINTMENT (OUTPATIENT)
Dept: PRIMARY CARE | Facility: CLINIC | Age: 65
End: 2025-06-04
Payer: MEDICARE

## 2025-06-04 VITALS
OXYGEN SATURATION: 98 % | DIASTOLIC BLOOD PRESSURE: 80 MMHG | SYSTOLIC BLOOD PRESSURE: 128 MMHG | HEART RATE: 72 BPM | TEMPERATURE: 97.2 F | HEIGHT: 63 IN | WEIGHT: 174.6 LBS | BODY MASS INDEX: 30.94 KG/M2

## 2025-06-04 DIAGNOSIS — Z12.31 ENCOUNTER FOR SCREENING MAMMOGRAM FOR MALIGNANT NEOPLASM OF BREAST: Primary | ICD-10-CM

## 2025-06-04 DIAGNOSIS — M35.00 SJOGREN'S SYNDROME, WITH UNSPECIFIED ORGAN INVOLVEMENT (MULTI): ICD-10-CM

## 2025-06-04 DIAGNOSIS — K21.9 GASTROESOPHAGEAL REFLUX DISEASE WITHOUT ESOPHAGITIS: ICD-10-CM

## 2025-06-04 DIAGNOSIS — I10 ESSENTIAL HYPERTENSION: ICD-10-CM

## 2025-06-04 DIAGNOSIS — M32.9 SYSTEMIC LUPUS ERYTHEMATOSUS, UNSPECIFIED SLE TYPE, UNSPECIFIED ORGAN INVOLVEMENT STATUS (MULTI): ICD-10-CM

## 2025-06-04 DIAGNOSIS — M35.3 PMR (POLYMYALGIA RHEUMATICA) (MULTI): ICD-10-CM

## 2025-06-04 DIAGNOSIS — I10 PRIMARY HYPERTENSION: ICD-10-CM

## 2025-06-04 PROBLEM — E66.811 OBESITY (BMI 30.0-34.9): Status: RESOLVED | Noted: 2023-05-05 | Resolved: 2025-06-04

## 2025-06-04 PROCEDURE — 3079F DIAST BP 80-89 MM HG: CPT | Performed by: INTERNAL MEDICINE

## 2025-06-04 PROCEDURE — 3074F SYST BP LT 130 MM HG: CPT | Performed by: INTERNAL MEDICINE

## 2025-06-04 PROCEDURE — 1159F MED LIST DOCD IN RCRD: CPT | Performed by: INTERNAL MEDICINE

## 2025-06-04 PROCEDURE — 99214 OFFICE O/P EST MOD 30 MIN: CPT | Performed by: INTERNAL MEDICINE

## 2025-06-04 PROCEDURE — 3008F BODY MASS INDEX DOCD: CPT | Performed by: INTERNAL MEDICINE

## 2025-06-04 PROCEDURE — 1160F RVW MEDS BY RX/DR IN RCRD: CPT | Performed by: INTERNAL MEDICINE

## 2025-06-04 PROCEDURE — 1036F TOBACCO NON-USER: CPT | Performed by: INTERNAL MEDICINE

## 2025-06-04 PROCEDURE — G2211 COMPLEX E/M VISIT ADD ON: HCPCS | Performed by: INTERNAL MEDICINE

## 2025-06-04 RX ORDER — LOSARTAN POTASSIUM AND HYDROCHLOROTHIAZIDE 12.5; 1 MG/1; MG/1
1 TABLET ORAL DAILY
Qty: 90 TABLET | Refills: 3 | Status: SHIPPED | OUTPATIENT
Start: 2025-06-04 | End: 2026-06-04

## 2025-06-04 NOTE — PROGRESS NOTES
"Subjective   Patient ID: Mojgan Villalobos is a 64 y.o. female who presents for Follow-up.  HPI  History of Present Illness  The patient is a 64-year-old female who presents today for a follow-up visit. She was last seen in 01/2025, during which her blood pressure medication was adjusted. At that time, Zepbound was prescribed, but she did not start the medication due to its cost.    Blood Pressure  - She experienced chapped skin and dryness when on a higher dose of the diuretic, but these symptoms have improved with the reduced dosage.  - She reports no chest pain or palpitations.  - She experiences shortness of breath only during physical exertion.    Sleep  - She continues to take zolpidem regularly for sleep, achieving approximately 6 hours of sleep per night.  - Attempts to reduce the dosage have been unsuccessful.  - She uses [employer] coupons to manage the cost of the medication.    Gastrointestinal  - She underwent a colonoscopy and endoscopy performed by Dr. Harris, which revealed one polyp.  - The upper endoscopy results were normal.  - She continues to experience periodic heartburn.    Knee Issues  - She continues to experience knee issues, despite receiving an injection from Dr. Rosenthal, which did not provide relief.  - She is considering muscle-building exercises but notes that her knee becomes problematic when pivoting during activities such as grass cutting, often resulting in clicking noises and pain.    Supplemental information: She reports overall good health, with the exception of her inability to afford Zepbound, which was prescribed for weight loss. She believes her blood sugar levels are within normal range.     Review of Systems   All other systems reviewed and are negative.      Objective   Visit Vitals  /80   Pulse 72   Temp 36.2 °C (97.2 °F)   Ht 1.6 m (5' 3\")   Wt 79.2 kg (174 lb 9.6 oz)   SpO2 98%   BMI 30.93 kg/m²   Smoking Status Former   BSA 1.88 m²      Patient Health " Questionnaire-2 Score: 0 (6/4/2025  9:06 AM)     Physical Exam  Vitals and nursing note reviewed.   Cardiovascular:      Rate and Rhythm: Normal rate and regular rhythm.      Heart sounds: Normal heart sounds.   Pulmonary:      Effort: Pulmonary effort is normal.      Breath sounds: Normal breath sounds.   Musculoskeletal:      Cervical back: Normal range of motion.   Neurological:      Mental Status: She is alert.         Results  Diagnostic Testing   - Colonoscopy: One polyp found, follow-up recommended in 5 years.   - Upper Endoscopy: Normal biopsy results.     Assessment & Plan  1. Hypertension:  - Blood pressure initially elevated but normalized to 128/80 upon recheck.  - No side effects from current medication regimen.  - Prescription for a 90-day supply of antihypertensive medication sent to pharmacy.    2. Insomnia:  - Continues regular use of zolpidem, unsuccessful in lowering dose.  - Medicare covers first 6 months, then becomes cash pay.  - Uses [employer] coupons to manage cost.  - Next prescription due on 07/24/2025, will email if refill needed.    3. Knee pain:  - Reports ongoing knee pain, clicking, and noise during activities.  - Recommended to explore noninvasive options with Dr. Rosenthal.  - Discussed impact of physical activities on knee pain.    4. Health Maintenance:  - Due for mammogram after 10/15/2025, order placed, advised to schedule.  - Recent colonoscopy and endoscopy normal, follow-up in 5 years unless symptoms arise.    Follow-up:  - Patient to follow up in 4 months.     1. Encounter for screening mammogram for malignant neoplasm of breast  BI mammo bilateral screening tomosynthesis      2. Primary hypertension  losartan-hydrochlorothiazide (Hyzaar) 100-12.5 mg tablet      3. Essential hypertension        4. Gastroesophageal reflux disease without esophagitis        5. PMR (polymyalgia rheumatica) (Multi)        6. Sjogren's syndrome, with unspecified organ involvement (Multi)        7.  Systemic lupus erythematosus, unspecified SLE type, unspecified organ involvement status (Multi)            Isabella Barrios MD   This medical note was created with the assistance of artificial intelligence (AI) for documentation purposes. The content has been reviewed and confirmed by the healthcare provider for accuracy and completeness. Patient consented to the use of audio recording and use of AI during their visit.

## 2025-06-19 DIAGNOSIS — K21.9 GASTROESOPHAGEAL REFLUX DISEASE WITHOUT ESOPHAGITIS: ICD-10-CM

## 2025-06-19 RX ORDER — ESOMEPRAZOLE MAGNESIUM 40 MG/1
40 CAPSULE, DELAYED RELEASE ORAL
Qty: 90 CAPSULE | Refills: 1 | Status: SHIPPED | OUTPATIENT
Start: 2025-06-19

## 2025-06-24 LAB
ANION GAP SERPL CALCULATED.4IONS-SCNC: 10 MMOL/L (CALC) (ref 7–17)
BASOPHILS # BLD AUTO: 47 CELLS/UL (ref 0–200)
BASOPHILS NFR BLD AUTO: 0.7 %
BUN SERPL-MCNC: 18 MG/DL (ref 7–25)
BUN/CREAT SERPL: NORMAL (CALC) (ref 6–22)
CALCIUM SERPL-MCNC: 9.9 MG/DL (ref 8.6–10.4)
CHLORIDE SERPL-SCNC: 105 MMOL/L (ref 98–110)
CO2 SERPL-SCNC: 28 MMOL/L (ref 20–32)
CREAT SERPL-MCNC: 1 MG/DL (ref 0.5–1.05)
CREAT UR-MCNC: 125 MG/DL (ref 20–275)
CRP SERPL-MCNC: 3.6 MG/L
EGFRCR SERPLBLD CKD-EPI 2021: 62 ML/MIN/1.73M2
EOSINOPHIL # BLD AUTO: 147 CELLS/UL (ref 15–500)
EOSINOPHIL NFR BLD AUTO: 2.2 %
ERYTHROCYTE [DISTWIDTH] IN BLOOD BY AUTOMATED COUNT: 13 % (ref 11–15)
ERYTHROCYTE [SEDIMENTATION RATE] IN BLOOD BY WESTERGREN METHOD: 14 MM/H
GLUCOSE SERPL-MCNC: 97 MG/DL (ref 65–139)
HCT VFR BLD AUTO: 37.5 % (ref 35–45)
HGB BLD-MCNC: 12.2 G/DL (ref 11.7–15.5)
LYMPHOCYTES # BLD AUTO: 1367 CELLS/UL (ref 850–3900)
LYMPHOCYTES NFR BLD AUTO: 20.4 %
MCH RBC QN AUTO: 30.9 PG (ref 27–33)
MCHC RBC AUTO-ENTMCNC: 32.5 G/DL (ref 32–36)
MCV RBC AUTO: 94.9 FL (ref 80–100)
MONOCYTES # BLD AUTO: 590 CELLS/UL (ref 200–950)
MONOCYTES NFR BLD AUTO: 8.8 %
NEUTROPHILS # BLD AUTO: 4549 CELLS/UL (ref 1500–7800)
NEUTROPHILS NFR BLD AUTO: 67.9 %
PLATELET # BLD AUTO: 307 THOUSAND/UL (ref 140–400)
PMV BLD REES-ECKER: 9.4 FL (ref 7.5–12.5)
POTASSIUM SERPL-SCNC: 4.2 MMOL/L (ref 3.5–5.3)
PROT UR-MCNC: 13 MG/DL (ref 5–24)
PROT/CREAT UR: 0.1 MG/MG CREAT (ref 0.02–0.18)
PROT/CREAT UR: 104 MG/G CREAT (ref 24–184)
RBC # BLD AUTO: 3.95 MILLION/UL (ref 3.8–5.1)
SODIUM SERPL-SCNC: 143 MMOL/L (ref 135–146)
WBC # BLD AUTO: 6.7 THOUSAND/UL (ref 3.8–10.8)

## 2025-06-27 DIAGNOSIS — M35.3 PMR (POLYMYALGIA RHEUMATICA) (MULTI): ICD-10-CM

## 2025-06-27 DIAGNOSIS — M32.9 SYSTEMIC LUPUS ERYTHEMATOSUS, UNSPECIFIED SLE TYPE, UNSPECIFIED ORGAN INVOLVEMENT STATUS (MULTI): ICD-10-CM

## 2025-06-30 ENCOUNTER — APPOINTMENT (OUTPATIENT)
Dept: RHEUMATOLOGY | Facility: CLINIC | Age: 65
End: 2025-06-30
Payer: MEDICARE

## 2025-06-30 VITALS
HEART RATE: 68 BPM | RESPIRATION RATE: 16 BRPM | WEIGHT: 174 LBS | DIASTOLIC BLOOD PRESSURE: 66 MMHG | TEMPERATURE: 97.4 F | HEIGHT: 63 IN | SYSTOLIC BLOOD PRESSURE: 124 MMHG | BODY MASS INDEX: 30.83 KG/M2

## 2025-06-30 DIAGNOSIS — M32.9 SYSTEMIC LUPUS ERYTHEMATOSUS, UNSPECIFIED SLE TYPE, UNSPECIFIED ORGAN INVOLVEMENT STATUS (MULTI): Primary | ICD-10-CM

## 2025-06-30 DIAGNOSIS — M35.00 SJOGREN'S SYNDROME, WITH UNSPECIFIED ORGAN INVOLVEMENT (MULTI): ICD-10-CM

## 2025-06-30 DIAGNOSIS — M35.3 PMR (POLYMYALGIA RHEUMATICA) (MULTI): ICD-10-CM

## 2025-06-30 DIAGNOSIS — M17.12 PRIMARY OSTEOARTHRITIS OF LEFT KNEE: ICD-10-CM

## 2025-06-30 PROCEDURE — 3078F DIAST BP <80 MM HG: CPT | Performed by: INTERNAL MEDICINE

## 2025-06-30 PROCEDURE — 1036F TOBACCO NON-USER: CPT | Performed by: INTERNAL MEDICINE

## 2025-06-30 PROCEDURE — 3074F SYST BP LT 130 MM HG: CPT | Performed by: INTERNAL MEDICINE

## 2025-06-30 PROCEDURE — 3008F BODY MASS INDEX DOCD: CPT | Performed by: INTERNAL MEDICINE

## 2025-06-30 PROCEDURE — 1160F RVW MEDS BY RX/DR IN RCRD: CPT | Performed by: INTERNAL MEDICINE

## 2025-06-30 PROCEDURE — 99214 OFFICE O/P EST MOD 30 MIN: CPT | Performed by: INTERNAL MEDICINE

## 2025-06-30 PROCEDURE — 1159F MED LIST DOCD IN RCRD: CPT | Performed by: INTERNAL MEDICINE

## 2025-06-30 PROCEDURE — 1125F AMNT PAIN NOTED PAIN PRSNT: CPT | Performed by: INTERNAL MEDICINE

## 2025-06-30 RX ORDER — PREDNISONE 1 MG/1
4 TABLET ORAL DAILY
Qty: 360 TABLET | Refills: 1 | Status: SHIPPED | OUTPATIENT
Start: 2025-06-30 | End: 2026-06-30

## 2025-06-30 ASSESSMENT — PATIENT HEALTH QUESTIONNAIRE - PHQ9
1. LITTLE INTEREST OR PLEASURE IN DOING THINGS: NOT AT ALL
SUM OF ALL RESPONSES TO PHQ9 QUESTIONS 1 AND 2: 0
2. FEELING DOWN, DEPRESSED OR HOPELESS: NOT AT ALL
1. LITTLE INTEREST OR PLEASURE IN DOING THINGS: NOT AT ALL

## 2025-06-30 ASSESSMENT — PAIN SCALES - GENERAL: PAINLEVEL_OUTOF10: 3

## 2025-06-30 NOTE — PATIENT INSTRUCTIONS
Referred for PT for left knee pain.  Referred to ortho to consider viscosupplementation for left knee (failed kenalog injection).  Check labs 10/25: CBC with diff, BMP, dsDNA, C3, C4, ESR, CRP, spot urine protein to creatinine ratio.  Change prednisone to 4 mg daily.  Follow-up in 4 months.
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
## 2025-07-02 ENCOUNTER — EVALUATION (OUTPATIENT)
Dept: PHYSICAL THERAPY | Facility: CLINIC | Age: 65
End: 2025-07-02
Payer: MEDICARE

## 2025-07-02 DIAGNOSIS — M62.81 GENERALIZED MUSCLE WEAKNESS: Primary | ICD-10-CM

## 2025-07-02 DIAGNOSIS — M17.12 PRIMARY OSTEOARTHRITIS OF LEFT KNEE: ICD-10-CM

## 2025-07-02 PROCEDURE — 97110 THERAPEUTIC EXERCISES: CPT | Performed by: PHYSICAL THERAPIST

## 2025-07-02 PROCEDURE — 97162 PT EVAL MOD COMPLEX 30 MIN: CPT | Performed by: PHYSICAL THERAPIST

## 2025-07-02 ASSESSMENT — ENCOUNTER SYMPTOMS
DEPRESSION: 0
OCCASIONAL FEELINGS OF UNSTEADINESS: 1
LOSS OF SENSATION IN FEET: 0

## 2025-07-02 ASSESSMENT — PAIN SCALES - GENERAL: PAINLEVEL_OUTOF10: 0 - NO PAIN

## 2025-07-09 ENCOUNTER — APPOINTMENT (OUTPATIENT)
Dept: ORTHOPEDIC SURGERY | Facility: CLINIC | Age: 65
End: 2025-07-09
Payer: MEDICARE

## 2025-07-09 DIAGNOSIS — M17.12 PRIMARY OSTEOARTHRITIS OF LEFT KNEE: ICD-10-CM

## 2025-07-09 PROCEDURE — 99204 OFFICE O/P NEW MOD 45 MIN: CPT | Performed by: ORTHOPAEDIC SURGERY

## 2025-07-09 NOTE — PROGRESS NOTES
History of present: History of advancing left knee arthritis had x-rays done in January 2025 that shows advanced patellofemoral arthrosis and moderate femoral-tibial arthritis    She had a cortisone shot a couple months ago from her primary care that did not help at all she is hoping to avoid arthroplasty        Past medical history:    The patient's past medical history, family history, social history and review of systems were documented on the patient's medical intake form.  The medical intake form was reviewed and scanned into the electronic medical record for future use.  History is otherwise negative except as stated in the history of present illness.    Physical exam:    General: Alert and oriented to person place and time.  No acute distress and breathing comfortably, pleasant and cooperative with examination.    Head and neck exam: Head is normocephalic atraumatic.    Neck: Supple no visible swelling or deformity.    Cardiovascular: Good perfusion to affected extremities without signs or symptoms of chest pain.    Lungs no audible wheezing or labored breathing on examination.    Abdominal exam: Nondistended nontender    Extremities: The affected left knee was examined and inspected and was tender to touch along the medial and lateral aspect with catching, locking or mechanical symptoms.    The skin was intact without breakdown or open wound.  Old incisions of present were healed.    There was a mild Brien exam seen with some evidence of instability and weakness in the collateral ligaments with varus valgus stressing and laxity in the anterior or posterior planes.    There was a negative Lachman's test pivot shift test and posterior drawer sign with no foot drop, numbness or tingling.    Sensation, reflexes and pulses in the foot and ankle are preserved.  There was an effusion.  Range of motion showed good straight leg raise with flexion to 150 degrees and extension to 0 degrees.  The patient had the  ability to bear weight but with discomfort.  The patient's gait was antalgic secondary to discomfort        Diagnostic studies: X-rays from January 2025 show advanced patellofemoral arthrosis with moderate femoral-tibial arthritis      Impression: Left knee advancing arthritis arthritic pain    No help from cortisone    Older gel shot injections    We offered a brace but it is difficult to wear during the summer months with heat especially for patellofemoral arthrosis so a simple knee sleeve may be best      Plan: Patient will follow-up for gel shot injections left knee

## 2025-07-15 ENCOUNTER — APPOINTMENT (OUTPATIENT)
Dept: PHYSICAL THERAPY | Facility: CLINIC | Age: 65
End: 2025-07-15
Payer: MEDICARE

## 2025-07-15 DIAGNOSIS — M62.81 GENERALIZED MUSCLE WEAKNESS: ICD-10-CM

## 2025-07-15 DIAGNOSIS — M17.12 PRIMARY OSTEOARTHRITIS OF LEFT KNEE: Primary | ICD-10-CM

## 2025-07-15 PROCEDURE — 97110 THERAPEUTIC EXERCISES: CPT | Performed by: PHYSICAL THERAPIST

## 2025-07-15 ASSESSMENT — PAIN SCALES - GENERAL: PAINLEVEL_OUTOF10: 0 - NO PAIN

## 2025-07-15 NOTE — PROGRESS NOTES
"Physical Therapy    Physical Therapy Treatment    Patient Name: Mojgan Villalobos  MRN: [id number]  Today's Date: 7/15/2025    Current Problem  1. Primary osteoarthritis of left knee  Follow Up In Physical Therapy      2. Generalized muscle weakness          General  Reason for Referral: Left knee pain  Referred By: Jewell Mauricio    Insurance: [employer]  Allowed visits: BMN  $20 copay    Subjective  Patient with no new complaints with regard to her knee. She consulted with ortho and was told that she will likely need a TKA but she will undergo gel injections first. She denies pain currently. She has been compliant with her HEP but does get some hamstring cramping with bridges.     Objective  Reviewed and progressed marked therapeutic exercise per patient tolerance (52212 - 32 minutes, 2 units) Maximo 5'  Heel slides 5\"x10  *Quad setting 5\"x10  SLR x10 x5   *Sidelying hip abduction 2x10  Bridges 3\"2x10  Ball squeeze 10\"x10  Supine clamshells BuTB 2x10  *LAQ 3\"2x10    *added to HEP    MT (63737 - 6 minutes) patellar mobilizations laterally and inferior per patient tolerance, left knee for mobility and vasodilation prior to therapeutic exercise.     Assessment  Patient with some non-painful crepitus with quad setting as well as with LAQ. Challenged with proximal strengthening. Encouraged continued compliance with HEP once per day.     Plan  Continue per POC at this time.    "

## 2025-07-21 ENCOUNTER — APPOINTMENT (OUTPATIENT)
Dept: PHYSICAL THERAPY | Facility: CLINIC | Age: 65
End: 2025-07-21
Payer: MEDICARE

## 2025-07-22 ENCOUNTER — APPOINTMENT (OUTPATIENT)
Dept: PHYSICAL THERAPY | Facility: CLINIC | Age: 65
End: 2025-07-22
Payer: MEDICARE

## 2025-07-22 DIAGNOSIS — M62.81 GENERALIZED MUSCLE WEAKNESS: ICD-10-CM

## 2025-07-22 DIAGNOSIS — M17.12 PRIMARY OSTEOARTHRITIS OF LEFT KNEE: Primary | ICD-10-CM

## 2025-07-22 PROCEDURE — 97110 THERAPEUTIC EXERCISES: CPT | Performed by: PHYSICAL THERAPIST

## 2025-07-22 ASSESSMENT — PAIN SCALES - GENERAL: PAINLEVEL_OUTOF10: 0 - NO PAIN

## 2025-07-22 NOTE — PROGRESS NOTES
"Physical Therapy    Physical Therapy Treatment    Patient Name: Mojgan Villalobos  MRN: [id number]  Today's Date: 7/22/2025    Current Problem  1. Primary osteoarthritis of left knee  Follow Up In Physical Therapy      2. Generalized muscle weakness          General  Reason for Referral: Left knee pain  Referred By: Jewell Mauricio    Insurance: [employer]  Allowed visits: BMN  $20 copay    Subjective  Patient with increased posterior hip pain with HEP, specifically with bridges or SLR task. She feels better today secondary to avoiding HEP yesterday. She does have a history of low back pain. She felt good following last visit. She did participate in a 45 minute water aerobics class this AM. She will undergo a gel injection tomorrow.     Objective  Reviewed and progressed marked therapeutic exercise per patient tolerance (62432 - 34 minutes, 3 units) Maximo 5'  Heel slides 10\"x10  Quad setting 5\"x15  SLR x10; 2x5 - discontinue   Sidelying hip abduction 2x10  Bridges 3\"2x10  Ball squeeze 10\"x10  *Sidelying clamshells BuTB 2x10  LAQ 2 lbs 3\"x10/3\"x10  Mini squats x5    *added to HEP     MT (81379 - 6 minutes) patellar mobilizations laterally and inferior per patient tolerance, left knee for mobility and vasodilation prior to therapeutic exercise.     Assessment  Patient with increased low back/posterior hip pain with SLR and with gluteus medius strengthening which could be related to muscle fatigue as opposed to actual pain. Advised her to hold on SLR to see if this mitigates symptoms. Demonstrates decent squat form but does have pain in left knee with mini squatting. Encouraged continued compliance with HEP.     Plan  Continue per POC at this time.      "

## 2025-07-23 ENCOUNTER — OFFICE VISIT (OUTPATIENT)
Dept: ORTHOPEDIC SURGERY | Facility: CLINIC | Age: 65
End: 2025-07-23
Payer: MEDICARE

## 2025-07-23 DIAGNOSIS — M17.12 PRIMARY OSTEOARTHRITIS OF LEFT KNEE: Primary | ICD-10-CM

## 2025-07-23 PROCEDURE — 20610 DRAIN/INJ JOINT/BURSA W/O US: CPT | Performed by: PHYSICIAN ASSISTANT

## 2025-07-23 NOTE — PROGRESS NOTES
History of Present Illness  Chief Complaint   Patient presents with    Left Knee - Injections     Durolane       Patient returns today for a side: left knee viscosupplementation injection. The patient is not endorsing relief of their knee pain from cortisone.    Review of Systems   GENERAL: Negative for malaise, significant weight loss, fever  MUSCULOSKELETAL: see HPI  NEURO:  Negative    side: left Knee Exam  Skin is intact without any evidence of erythema or ecchymosis   mild effusion  There is tenderness over medial and lateral joint line    Assessment:  Patient with known osteoarthritis of the side: left knee    Plan:  Durolane injection administered under sterile conditions to the medial aspect of the left patella and tolerated by patient.  She was advised to elevate extremity apply ice intermittently and take Tylenol.    L Inj/Asp: L knee on 7/23/2025 3:20 PM  Indications: pain  Details: 22 G needle, anteromedial approach  Medications: 3 mL sodium hyaluronate 60 mg/3 mL  Outcome: tolerated well, no immediate complications  Procedure, treatment alternatives, risks and benefits explained, specific risks discussed. Consent was given by the patient. Immediately prior to procedure a time out was called to verify the correct patient, procedure, equipment, support staff and site/side marked as required. Patient was prepped and draped in the usual sterile fashion.

## 2025-07-28 ENCOUNTER — APPOINTMENT (OUTPATIENT)
Dept: PHYSICAL THERAPY | Facility: CLINIC | Age: 65
End: 2025-07-28
Payer: MEDICARE

## 2025-07-29 ENCOUNTER — APPOINTMENT (OUTPATIENT)
Dept: PHYSICAL THERAPY | Facility: CLINIC | Age: 65
End: 2025-07-29
Payer: MEDICARE

## 2025-07-29 DIAGNOSIS — M62.81 GENERALIZED MUSCLE WEAKNESS: ICD-10-CM

## 2025-07-29 DIAGNOSIS — M17.12 PRIMARY OSTEOARTHRITIS OF LEFT KNEE: Primary | ICD-10-CM

## 2025-07-29 PROCEDURE — 97110 THERAPEUTIC EXERCISES: CPT | Performed by: PHYSICAL THERAPIST

## 2025-07-29 ASSESSMENT — PAIN SCALES - GENERAL: PAINLEVEL_OUTOF10: 0 - NO PAIN

## 2025-07-29 NOTE — PROGRESS NOTES
"Physical Therapy    Physical Therapy Treatment    Patient Name: Mojgan Villalobos  MRN: [id number]  Today's Date: 7/29/2025    Current Problem  1. Primary osteoarthritis of left knee  Follow Up In Physical Therapy      2. Generalized muscle weakness          General  Reason for Referral: (P) Left knee pain  Referred By: Jewell Mauricio (P)    Insurance: [employer]  Allowed visits: BMN  $20 copay    Subjective  Patient with continued pain in her knee. She does not feel as though recent gel injection was beneficial. She has been able to participate in yoga and water aerobics without issue. She does have some stiffness today but denies pain currently.       Objective  Reviewed and progressed marked therapeutic exercise per patient tolerance (82729 - 40 minutes, 3 units) Maximo 5'  Prone quad stretch 30\"x3  Quad setting 5\"x15  Bridges 3\"2x12  Ball squeeze 10\"x12  Sidelying hip abduction 2x12  Sidelying clamshells BuTB 2x10  LAQ 2 lbs 3\"x15  *Sit to stands 2x10   SLS 1' trial     *added to HEP    Assessment  Patient challenged with proximal strengthening but able to complete. Non-painful crepitus occurs with LAQ task. Did better with sit to stands as opposed to mini-squats likely due to improved form. Encouraged continued compliance with HEP.     Plan  Continue per POC at this time.    "

## 2025-08-04 ENCOUNTER — APPOINTMENT (OUTPATIENT)
Dept: PHYSICAL THERAPY | Facility: CLINIC | Age: 65
End: 2025-08-04
Payer: MEDICARE

## 2025-08-04 NOTE — PROGRESS NOTES
"Physical Therapy    Physical Therapy Re-Evaluation    Patient Name: Mojgan Villalobos  MRN: [id number]  Today's Date: 2025    Current Problem  1. Primary osteoarthritis of left knee  Follow Up In Physical Therapy      2. Generalized muscle weakness          General  Reason for Referral: Left knee pain  Referred By: Jewell Mauricio    Insurance: [employer]  Allowed visits: BMN  $20 copay    Subjective  Patient with some improvement in her strength overall. She continues to have some pain \"if I turn the wrong way\". Chief complaint is \"going up and down the steps, I have to lead with my right\". She has more pain descending steps but does feel like she can ascend with increased ease. If she is carrying something she cannot negotiate steps reciprocally. Biggest limitation is \"crouching, I can't crouch for very long\". She does not F/U with Dr. Vee again; she is trying to decide if she will undergo a TKA this year.     Objective  Worst pain in the last 24 hours, 7 improved from 10/10     Precautions: STEADi = 9      Observation: posture WFL; evidence of venous insufficiency in bilateral LE     Gait Assessment: ambulating into clinic without an assistive device     AROM  Left knee flexion: 140, improved from 134 degrees \"stiff\"   Left knee extension: -2 p!, decreased from -1 degree p!      MMT  Left quadriceps: 4+  Left iliopsoas: 4, improved from 4- P!   Left gluteus medius: 4  Left hip adductors: 4 p!   Left gluteus jaison: 4-, improved from 3+  Left hamstrin, improved from 4-     Flexibility  Left hamstring: WNL, improved from slightly limited  Left gastrocnemius: slightly limited   Left quadriceps: WNL, improved from slightly limited     Palpation: TTP at MJL with palpable inflammation at anteromedial and posterior knee, left LE.      Mobility: normal patellar mobility in left PFJ.      LEFS: 55%, improved from 48%    Re-evaluation performed on this date with new objective measurements obtained as " "above. See updated goals below.    Reviewed and progressed marked therapeutic exercise per patient tolerance (66846 - 44 minutes, 3 units) Maximo 5'  Quad setting 5\"x15  Bridges 3\"2x12  Ball squeeze 10\"x15  Sidelying hip abduction GTB 2x10  Sidelying clamshells BuTB 2x10  LAQ P!   Sit to stands 2x10   SLS 1' trial    Assessment  Patient with some improvement in strength and AROM as well as pain level and overall function. She will benefit from continued compliance with HEP to maintain therapeutic gains as well as continued participation in water aerobics twice per week. Recommending discharge from this episode of care.     Plan  No further visits planned at this time.     Goals  Independent with HEP to expedite progress and promote goal achievement - met  Increase LEFS to > or equal to 60% functional for increased functional ability - partially met  Increase AROM left knee to > or equal to 0 - 140 degrees for improved stability and ease with reciprocal stair negotiation - partially met  Increase strength left quadriceps and iliopsoas to 5/5 and bilateral gluteals and hip adductors to > or equal to 4/5 for improved proximal stability and tolerance to multiplanar activities - partially met  Decrease pain at worst to < or equal to 2/10 for improved QOL and confidence in functional ability - partially met  Patient report increased ability to negotiate 1 flight of steps reciprocally for evidence of improved function - partially met    "

## 2025-08-05 ENCOUNTER — APPOINTMENT (OUTPATIENT)
Dept: PHYSICAL THERAPY | Facility: CLINIC | Age: 65
End: 2025-08-05
Payer: MEDICARE

## 2025-08-05 DIAGNOSIS — M62.81 GENERALIZED MUSCLE WEAKNESS: ICD-10-CM

## 2025-08-05 DIAGNOSIS — M17.12 PRIMARY OSTEOARTHRITIS OF LEFT KNEE: Primary | ICD-10-CM

## 2025-08-05 PROCEDURE — 97110 THERAPEUTIC EXERCISES: CPT | Performed by: PHYSICAL THERAPIST

## 2025-08-05 ASSESSMENT — PAIN SCALES - GENERAL: PAINLEVEL_OUTOF10: 0 - NO PAIN

## 2025-08-30 DIAGNOSIS — I10 ESSENTIAL HYPERTENSION: ICD-10-CM

## 2025-08-30 RX ORDER — AMLODIPINE BESYLATE 5 MG/1
5 TABLET ORAL DAILY
Qty: 90 TABLET | Refills: 1 | Status: SHIPPED | OUTPATIENT
Start: 2025-08-30

## 2025-10-27 ENCOUNTER — APPOINTMENT (OUTPATIENT)
Dept: RHEUMATOLOGY | Facility: CLINIC | Age: 65
End: 2025-10-27
Payer: MEDICARE

## 2025-10-29 ENCOUNTER — APPOINTMENT (OUTPATIENT)
Dept: PRIMARY CARE | Facility: CLINIC | Age: 65
End: 2025-10-29
Payer: MEDICARE

## 2026-01-26 ENCOUNTER — APPOINTMENT (OUTPATIENT)
Dept: PRIMARY CARE | Facility: CLINIC | Age: 66
End: 2026-01-26
Payer: MEDICARE